# Patient Record
Sex: FEMALE | Race: WHITE | Employment: FULL TIME
[De-identification: names, ages, dates, MRNs, and addresses within clinical notes are randomized per-mention and may not be internally consistent; named-entity substitution may affect disease eponyms.]

---

## 2017-07-15 ENCOUNTER — HEALTH MAINTENANCE LETTER (OUTPATIENT)
Age: 47
End: 2017-07-15

## 2018-07-24 ENCOUNTER — OFFICE VISIT (OUTPATIENT)
Dept: FAMILY MEDICINE | Facility: CLINIC | Age: 48
End: 2018-07-24
Payer: COMMERCIAL

## 2018-07-24 VITALS
HEIGHT: 66 IN | BODY MASS INDEX: 18.45 KG/M2 | OXYGEN SATURATION: 99 % | HEART RATE: 67 BPM | DIASTOLIC BLOOD PRESSURE: 71 MMHG | SYSTOLIC BLOOD PRESSURE: 105 MMHG | TEMPERATURE: 97.9 F | RESPIRATION RATE: 16 BRPM | WEIGHT: 114.8 LBS

## 2018-07-24 DIAGNOSIS — Z00.00 ENCOUNTER FOR ROUTINE ADULT HEALTH EXAMINATION WITHOUT ABNORMAL FINDINGS: Primary | ICD-10-CM

## 2018-07-24 PROCEDURE — G0145 SCR C/V CYTO,THINLAYER,RESCR: HCPCS | Performed by: FAMILY MEDICINE

## 2018-07-24 PROCEDURE — 87624 HPV HI-RISK TYP POOLED RSLT: CPT | Performed by: FAMILY MEDICINE

## 2018-07-24 PROCEDURE — 99396 PREV VISIT EST AGE 40-64: CPT | Performed by: FAMILY MEDICINE

## 2018-07-24 NOTE — PROGRESS NOTES
SUBJECTIVE:   CC: Yessica Gar is an 47 year old woman who presents for preventive health visit.     Physical   Annual:     Getting at least 3 servings of Calcium per day:  Yes    Bi-annual eye exam:  Yes    Dental care twice a year:  Yes    Sleep apnea or symptoms of sleep apnea:  None    Diet:  Vegetarian/vegan    Frequency of exercise:  4-5 days/week    Duration of exercise:  30-45 minutes    Taking medications regularly:  Not Applicable    Additional concerns today:  YES            Today's PHQ-2 Score:   PHQ-2 (  [employer]) 2018   Q1: Little interest or pleasure in doing things 0   Q2: Feeling down, depressed or hopeless 0   PHQ-2 Score 0   Q1: Little interest or pleasure in doing things Not at all   Q2: Feeling down, depressed or hopeless Not at all   PHQ-2 Score 0       Abuse: Current or Past(Physical, Sexual or Emotional)- No  Do you feel safe in your environment - Yes    Social History   Substance Use Topics     Smoking status: Never Smoker     Smokeless tobacco: Never Used     Alcohol use Yes      Comment: a couple times a month     Alcohol Use 2018   If you drink alcohol do you typically have greater than 3 drinks per day OR greater than 7 drinks per week? No       Reviewed orders with patient.  Reviewed health maintenance and updated orders accordingly - Yes  Labs reviewed in EPIC  BP Readings from Last 3 Encounters:   18 105/71   16 116/75   01/06/15 102/62    Wt Readings from Last 3 Encounters:   18 114 lb 12.8 oz (52.1 kg)   16 117 lb (53.1 kg)   01/06/15 119 lb 6.4 oz (54.2 kg)                  Patient Active Problem List   Diagnosis     ASCUS on Pap smear     CARDIOVASCULAR SCREENING; LDL GOAL LESS THAN 160     SAB (spontaneous )     Past Surgical History:   Procedure Laterality Date     NO HISTORY OF SURGERY         Social History   Substance Use Topics     Smoking status: Never Smoker     Smokeless tobacco: Never Used     Alcohol use Yes       Comment: a couple times a month     Family History   Problem Relation Age of Onset     C.A.D. Paternal Grandfather      Hypertension Father      Lipids Father      GASTROINTESTINAL DISEASE Father      Gall bladder removed (not due to stones)/polyps removed from colon age 60     Diabetes Paternal Grandmother      Arthritis Maternal Grandmother      Hypertension Brother      Lipids Brother      Coronary Artery Disease No family hx of      Hyperlipidemia No family hx of      Cerebrovascular Disease No family hx of      Breast Cancer No family hx of      Colon Cancer No family hx of      Prostate Cancer No family hx of      Other Cancer No family hx of      Depression No family hx of      Anxiety Disorder No family hx of      Mental Illness No family hx of      Substance Abuse No family hx of      Anesthesia Reaction No family hx of      Asthma No family hx of      Osteoperosis No family hx of      Genetic Disorder No family hx of      Thyroid Disease No family hx of      Obesity No family hx of      Unknown/Adopted No family hx of          No current outpatient prescriptions on file.     Allergies   Allergen Reactions     Cats      Recent Labs   Lab Test  01/06/15   0740  04/29/14   0743   LDL  74  86   HDL  59  65   TRIG  87  78   TSH  2.81  2.21        Patient under age 50, mutual decision reflected in health maintenance.      Pertinent mammograms are reviewed under the imaging tab.  History of abnormal Pap smear: NO - age 30- 65 PAP every 3 years recommended  PAP / HPV 4/29/2014 4/17/2012 8/3/2010   PAP NIL NIL NIL     Reviewed and updated as needed this visit by clinical staff  Tobacco  Meds  Med Hx  Surg Hx  Fam Hx  Soc Hx        Reviewed and updated as needed this visit by Provider        Past Medical History:   Diagnosis Date     ASCUS favor benign 05, 07, 08    all neg HPV      Past Surgical History:   Procedure Laterality Date     NO HISTORY OF SURGERY         Review of Systems  CONSTITUTIONAL: NEGATIVE  for fever, chills, change in weight  INTEGUMENTARU/SKIN: NEGATIVE for worrisome rashes, moles or lesions  EYES: NEGATIVE for vision changes or irritation  ENT: NEGATIVE for ear, mouth and throat problems  RESP: NEGATIVE for significant cough or SOB  BREAST: NEGATIVE for masses, tenderness or discharge  CV: NEGATIVE for chest pain, palpitations or peripheral edema  GI: NEGATIVE for nausea, abdominal pain, heartburn, or change in bowel habits  : NEGATIVE for unusual urinary or vaginal symptoms. Periods are regular.  MUSCULOSKELETAL: NEGATIVE for significant arthralgias or myalgia  NEURO: NEGATIVE for weakness, dizziness or paresthesias  PSYCHIATRIC: NEGATIVE for changes in mood or affect     OBJECTIVE:   There were no vitals taken for this visit.  Physical Exam  GENERAL: healthy, alert and no distress  EYES: Eyes grossly normal to inspection, PERRL and conjunctivae and sclerae normal  HENT: ear canals and TM's normal, nose and mouth without ulcers or lesions  NECK: no adenopathy, no asymmetry, masses, or scars and thyroid normal to palpation  RESP: lungs clear to auscultation - no rales, rhonchi or wheezes  BREAST: normal without masses, tenderness or nipple discharge and no palpable axillary masses or adenopathy  CV: regular rate and rhythm, normal S1 S2, no S3 or S4, no murmur, click or rub, no peripheral edema and peripheral pulses strong  ABDOMEN: soft, nontender, no hepatosplenomegaly, no masses and bowel sounds normal   (female): normal female external genitalia, normal urethral meatus, vaginal mucosa pink, moist, well rugated, and normal cervix/adnexa/uterus without masses or discharge  MS: no gross musculoskeletal defects noted, no edema  SKIN: no suspicious lesions or rashes  NEURO: Normal strength and tone, mentation intact and speech normal  PSYCH: mentation appears normal, affect normal/bright    Diagnostic Test Results:  none     ASSESSMENT/PLAN:   1. Encounter for routine adult health examination  "without abnormal findings  Discussed diet,calcium,exercise.Went over self breast exam.Thin prep was done.Eyes and teeth UTD.No immunizations needed today.See orders below for tests ordered and screening needed.    - GLUCOSE; Future  - Pap imaged thin layer screen with HPV - recommended age 30 - 65 years (select HPV order below)  - Lipid Profile (Chol, Trig, HDL, LDL calc); Future    COUNSELING:  Reviewed preventive health counseling, as reflected in patient instructions       Regular exercise       Healthy diet/nutrition       Vision screening       Hearing screening       Osteoporosis Prevention/Bone Health    BP Readings from Last 1 Encounters:   04/20/16 116/75     Estimated body mass index is 18.88 kg/(m^2) as calculated from the following:    Height as of 4/20/16: 5' 6\" (1.676 m).    Weight as of 4/20/16: 117 lb (53.1 kg).           reports that she has never smoked. She has never used smokeless tobacco.      Counseling Resources:  ATP IV Guidelines  Pooled Cohorts Equation Calculator  Breast Cancer Risk Calculator  FRAX Risk Assessment  ICSI Preventive Guidelines  Dietary Guidelines for Americans, 2010  USDA's MyPlate  ASA Prophylaxis  Lung CA Screening    Brenda Duckworth MD  [facility]  Answers for HPI/ROS submitted by the patient on 7/24/2018   PHQ-2 Score: 0    "

## 2018-07-24 NOTE — LETTER
August 1, 2018    Yessica Gar  [address] [address]    Dear Yessica,  We are happy to inform you that your PAP smear result from 07/24/18 is normal.  We are now able to do a follow up test on PAP smears. The DNA test is for HPV (Human Papilloma Virus). Cervical cancer is closely linked with certain types of HPV. Your results showed no evidence of high risk HPV.  Therefore we recommend you return in 3 years for your next pap smear.  You will still need to return to the clinic every year for an annual exam and other preventive tests.  Please contact the clinic at [phone] with any questions.  Sincerely,    Brenda Duckworth MD/freddie

## 2018-07-24 NOTE — MR AVS SNAPSHOT
After Visit Summary   7/24/2018    Yessica Gar    MRN: [id number]           Patient Information     Date Of Birth          1970        Visit Information        Provider Department      7/24/2018 1:45 PM Brenda Duckworth MD [facility]        Today's Diagnoses     Encounter for routine adult health examination without abnormal findings    -  1      Care Instructions      Preventive Health Recommendations  Female Ages 40 to 49    Yearly exam:     See your health care provider every year in order to  1. Review health changes.   2. Discuss preventive care.    3. Review your medicines if your doctor prescribed any.      Get a Pap test every three years (unless you have an abnormal result and your provider advises testing more often).      If you get Pap tests with HPV test, you only need to test every 5 years, unless you have an abnormal result. You do not need a Pap test if your uterus was removed (hysterectomy) and you have not had cancer.      You should be tested each year for STDs (sexually transmitted diseases), if you're at risk.     Ask your doctor if you should have a mammogram.      Have a colonoscopy (test for colon cancer) if someone in your family has had colon cancer or polyps before age 50.       Have a cholesterol test every 5 years.       Have a diabetes test (fasting glucose) after age 45. If you are at risk for diabetes, you should have this test every 3 years.    Shots: Get a flu shot each year. Get a tetanus shot every 10 years.     Nutrition:     Eat at least 5 servings of fruits and vegetables each day.    Eat whole-grain bread, whole-wheat pasta and brown rice instead of white grains and rice.    Get adequate Calcium and Vitamin D.      Lifestyle    Exercise at least 150 minutes a week (an average of 30 minutes a day, 5 days a week). This will help you control your weight and prevent disease.    Limit alcohol to one drink per day.    No smoking.     Wear  "sunscreen to prevent skin cancer.    See your dentist every six months for an exam and cleaning.          Follow-ups after your visit        Future tests that were ordered for you today     Open Future Orders        Priority Expected Expires Ordered    GLUCOSE Routine  10/24/2018 2018    Lipid Profile (Chol, Trig, HDL, LDL calc) Routine  10/24/2018 2018            Who to contact     If you have questions or need follow up information about today's clinic visit or your schedule please contact [facility] directly at [phone].  Normal or non-critical lab and imaging results will be communicated to you by [employer], letter or phone within 4 business days after the clinic has received the results. If you do not hear from us within 7 days, please contact the clinic through [employer] or phone. If you have a critical or abnormal lab result, we will notify you by phone as soon as possible.  Submit refill requests through [employer] or call your pharmacy and they will forward the refill request to us. Please allow 3 business days for your refill to be completed.          Additional Information About Your Visit        MyChart Information     [employer] lets you send messages to your doctor, view your test results, renew your prescriptions, schedule appointments and more. To sign up, go to [email or web address] . Click on \"Log in\" on the left side of the screen, which will take you to the Welcome page. Then click on \"Sign up Now\" on the right side of the page.     You will be asked to enter the access code listed below, as well as some personal information. Please follow the directions to create your username and password.     Your access code is: [email or web address]  Expires: 10/22/2018  1:41 PM     Your access code will  in 90 days. If you need help or a new code, please call your [facility] or [phone].        Care EveryWhere ID     This is your Care EveryWhere ID. This could be used by other " "organizations to access your [facility] medical records  [id number]        Your Vitals Were     Pulse Temperature Respirations Height Last Period Pulse Oximetry    67 97.9  F (36.6  C) (Oral) 16 5' 6\" (1.676 m) 07/10/2018 (Exact Date) 99%    Breastfeeding? BMI (Body Mass Index)                No 18.53 kg/m2           Blood Pressure from Last 3 Encounters:   07/24/18 105/71   04/20/16 116/75   01/06/15 102/62    Weight from Last 3 Encounters:   07/24/18 114 lb 12.8 oz (52.1 kg)   04/20/16 117 lb (53.1 kg)   01/06/15 119 lb 6.4 oz (54.2 kg)              We Performed the Following     Pap imaged thin layer screen with HPV - recommended age 30 - 65 years (select HPV order below)        Primary Care Provider Office Phone # Fax #    Brenda Duckworth [phone] [address] [address]        Equal Access to Services     [facility] CenterNETTA : Hadii aad ku hadasho Soomaali, waaxda luqadaha, qaybta kaalmada adeginayamiguel, hortensia gonzáles . So [facility] [phone].    ATENCIÓN: Si habla español, tiene a augustin disposición servicios gratuitos de asistencia lingüística. Llame al [phone].    We comply with applicable federal civil rights laws and [place] laws. We do not discriminate on the basis of race, color, national origin, age, disability, sex, sexual orientation, or gender identity.            Thank you!     Thank you for choosing [facility]  for your care. Our goal is always to provide you with excellent care. Hearing back from our patients is one way we can continue to improve our services. Please take a few minutes to complete the written survey that you may receive in the mail after your visit with us. Thank you!             Your Updated Medication List - Protect others around you: Learn how to safely use, store and throw away your medicines at www.disposemymeds.org.      Notice  As of 7/24/2018  2:27 PM    You have not been prescribed any " medications.

## 2018-07-30 LAB
COPATH REPORT: NORMAL
PAP: NORMAL

## 2018-07-31 LAB
FINAL DIAGNOSIS: NORMAL
HPV HR 12 DNA CVX QL NAA+PROBE: NEGATIVE
HPV16 DNA SPEC QL NAA+PROBE: NEGATIVE
HPV18 DNA SPEC QL NAA+PROBE: NEGATIVE
SPECIMEN DESCRIPTION: NORMAL
SPECIMEN SOURCE CVX/VAG CYTO: NORMAL

## 2018-08-16 ENCOUNTER — OFFICE VISIT (OUTPATIENT)
Dept: PODIATRY | Facility: CLINIC | Age: 48
End: 2018-08-16
Payer: COMMERCIAL

## 2018-08-16 VITALS
DIASTOLIC BLOOD PRESSURE: 58 MMHG | SYSTOLIC BLOOD PRESSURE: 100 MMHG | WEIGHT: 114 LBS | HEIGHT: 66 IN | BODY MASS INDEX: 18.32 KG/M2

## 2018-08-16 DIAGNOSIS — M79.671 RIGHT FOOT PAIN: Primary | ICD-10-CM

## 2018-08-16 PROCEDURE — 99203 OFFICE O/P NEW LOW 30 MIN: CPT | Performed by: PODIATRIST

## 2018-08-16 NOTE — LETTER
"    8/16/2018         RE: Yessica Gar  [address] [address]        Dear Colleague,    Thank you for referring your patient, Yessica Gar, to the [facility]. Please see a copy of my visit note below.    Foot & Ankle Surgery  August 16, 2018    CC: \"pain in right foot\"    I was asked to see Yessiac Gar regarding the chief complaint by:  self    HPI:  Pt is a 47 year old female who presents with above complaint.  R foot pain x 6 weeks, no injury/inciting event, although she is training for a 10 mile run.  Pain is worse in the morning.  It used to crack per patient. Feels like \"the skin is too tight\" over the bump.  Pain worse without shoes.  Describes \"tender to touch, deep ache/shooting pain\".  7/10 daily, worse with \"pointing toes, bending foot\".  She has done \"nothing\" for treatment.      ROS:   Pos for CC.  The patient denies current nausea, vomiting, chills, fevers, belly pain, calf pain, chest pain or SOB.  Complete remainder of ROS is otherwise neg.    VITALS:    Vitals:    08/16/18 1332   BP: 100/58   Weight: 114 lb (51.7 kg)   Height: 5' 6\" (1.676 m)       PMH:    Past Medical History:   Diagnosis Date     ASCUS favor benign 05, 07, 08    all neg HPV       SXHX:    Past Surgical History:   Procedure Laterality Date     NO HISTORY OF SURGERY          MEDS:    No current outpatient prescriptions on file.     No current facility-administered medications for this visit.        ALL:     Allergies   Allergen Reactions     Cats        FMH:    Family History   Problem Relation Age of Onset     C.A.D. Paternal Grandfather      Hypertension Father      Lipids Father      GASTROINTESTINAL DISEASE Father      Gall bladder removed (not due to stones)/polyps removed from colon age 60     Diabetes Paternal Grandmother      Arthritis Maternal Grandmother      Hypertension Brother      Lipids Brother      Coronary Artery Disease No family hx of      " Hyperlipidemia No family hx of      Cerebrovascular Disease No family hx of      Breast Cancer No family hx of      Colon Cancer No family hx of      Prostate Cancer No family hx of      Other Cancer No family hx of      Depression No family hx of      Anxiety Disorder No family hx of      Mental Illness No family hx of      Substance Abuse No family hx of      Anesthesia Reaction No family hx of      Asthma No family hx of      Osteoperosis No family hx of      Genetic Disorder No family hx of      Thyroid Disease No family hx of      Obesity No family hx of      Unknown/Adopted No family hx of        SocHx:    Social History     Social History     Marital status:      Spouse name: Luis F     Number of children: 1     Years of education: N/A     Occupational History      [employer]     Social History Main Topics     Smoking status: Never Smoker     Smokeless tobacco: Never Used     Alcohol use Yes      Comment: a couple times a month     Drug use: No     Sexual activity: Yes     Partners: Male     Birth control/ protection: Condom     Other Topics Concern     Parent/Sibling W/ Cabg, Mi Or Angioplasty Before 65f 55m? No     Social History Narrative    Social Documentation: 12/22/2009        Balanced Diet: YES    Calcium intake: 2-3 per day    Caffeine: 0-1 per day    Exercise:  type of activity no;  0 times per week    Sunscreen: Yes    Seatbelts:  Yes    Self Breast Exam:  No    Self Testicular Exam: n/a    Physical/Emotional/Sexual Abuse: No    Do you feel safe in your environment? Yes        Cholesterol screen up to date: No    Eye Exam up to date: Yes    Dental Exam up to date: Yes    Pap smear up to date: PAP   ASC-US   11/3/08    Mammogram up to date: No: needs referral    Dexa Scan up to date: Does Not Apply    Colonoscopy up to date: Does Not Apply    Immunizations up to date: Unknown    Glucose screen if over 40:  N/a        Misty Garrido [facility]                                                    EXAMINATION:  Gen:   No apparent distress  Neuro:   A&Ox3, no deficits  Psych:    Answering questions appropriately for age and situation with normal affect  Head:    NCAT  Eye:    Visual scanning without deficit  Ear:    Response to auditory stimuli wnl  Lung:    Non-labored breathing on RA noted  Abd:    NTND per patient report  Lymph:    Neg for pitting/non-pitting edema BLE  Vasc:    Pulses palpable, CFT minimally delayed  Neuro:    Light touch sensation intact to all sensory nerve distributions without paresthesias  Derm:    Neg for nodules, lesions or ulcerations  MSK:    Palpable tender firm mass dorsolateral R midfoot, between 4th and 5th met bases, although she has the same palpable mass, albeit not tender, in the same location on the left foot. No other metatarsal, intermetatarsal or adjacent P.brevis tendon pain.    Calf:    Neg for redness, swelling or tenderness    Assessment:  47 year old female with painful right foot mass/bump      Plan:  Discussed etiologies, anatomy and options  1.  Painful R foot mass/bump  -comfortable shoe gear; minimize shoeless ambulation  -RICE/NSAID vs tylenol as aggressively as symptoms dictate  -discussed importance of activity modifications based on pain levels  -discussed walking boot, PO steroid; declined, consider if no improvement is noted  -consider films next visit if no improvement    Follow up:  3 weeks or sooner with acute issues      Patient's medical history was reviewed today    Body mass index is 18.4 kg/(m^2).          Zafar Pacheco DPM FACFAS FACFAOM  Podiatric Foot & Ankle Surgeon  [facility]  [phone]        Again, thank you for allowing me to participate in the care of your patient.        Sincerely,        Zafar Pacheco DPM, JYOTHI

## 2018-08-16 NOTE — PROGRESS NOTES
"Foot & Ankle Surgery  August 16, 2018    CC: \"pain in right foot\"    I was asked to see Yessica Gar regarding the chief complaint by:  self    HPI:  Pt is a 47 year old female who presents with above complaint.  R foot pain x 6 weeks, no injury/inciting event, although she is training for a 10 mile run.  Pain is worse in the morning.  It used to crack per patient. Feels like \"the skin is too tight\" over the bump.  Pain worse without shoes.  Describes \"tender to touch, deep ache/shooting pain\".  7/10 daily, worse with \"pointing toes, bending foot\".  She has done \"nothing\" for treatment.      ROS:   Pos for CC.  The patient denies current nausea, vomiting, chills, fevers, belly pain, calf pain, chest pain or SOB.  Complete remainder of ROS is otherwise neg.    VITALS:    Vitals:    08/16/18 1332   BP: 100/58   Weight: 114 lb (51.7 kg)   Height: 5' 6\" (1.676 m)       PMH:    Past Medical History:   Diagnosis Date     ASCUS favor benign 05, 07, 08    all neg HPV       SXHX:    Past Surgical History:   Procedure Laterality Date     NO HISTORY OF SURGERY          MEDS:    No current outpatient prescriptions on file.     No current facility-administered medications for this visit.        ALL:     Allergies   Allergen Reactions     Cats        FMH:    Family History   Problem Relation Age of Onset     C.A.D. Paternal Grandfather      Hypertension Father      Lipids Father      GASTROINTESTINAL DISEASE Father      Gall bladder removed (not due to stones)/polyps removed from colon age 60     Diabetes Paternal Grandmother      Arthritis Maternal Grandmother      Hypertension Brother      Lipids Brother      Coronary Artery Disease No family hx of      Hyperlipidemia No family hx of      Cerebrovascular Disease No family hx of      Breast Cancer No family hx of      Colon Cancer No family hx of      Prostate Cancer No family hx of      Other Cancer No family hx of      Depression No family hx of      Anxiety " Disorder No family hx of      Mental Illness No family hx of      Substance Abuse No family hx of      Anesthesia Reaction No family hx of      Asthma No family hx of      Osteoperosis No family hx of      Genetic Disorder No family hx of      Thyroid Disease No family hx of      Obesity No family hx of      Unknown/Adopted No family hx of        SocHx:    Social History     Social History     Marital status:      Spouse name: Luis F     Number of children: 1     Years of education: N/A     Occupational History      [employer]     Social History Main Topics     Smoking status: Never Smoker     Smokeless tobacco: Never Used     Alcohol use Yes      Comment: a couple times a month     Drug use: No     Sexual activity: Yes     Partners: Male     Birth control/ protection: Condom     Other Topics Concern     Parent/Sibling W/ Cabg, Mi Or Angioplasty Before 65f 55m? No     Social History Narrative    Social Documentation: 12/22/2009        Balanced Diet: YES    Calcium intake: 2-3 per day    Caffeine: 0-1 per day    Exercise:  type of activity no;  0 times per week    Sunscreen: Yes    Seatbelts:  Yes    Self Breast Exam:  No    Self Testicular Exam: n/a    Physical/Emotional/Sexual Abuse: No    Do you feel safe in your environment? Yes        Cholesterol screen up to date: No    Eye Exam up to date: Yes    Dental Exam up to date: Yes    Pap smear up to date: PAP   ASC-US   11/3/08    Mammogram up to date: No: needs referral    Dexa Scan up to date: Does Not Apply    Colonoscopy up to date: Does Not Apply    Immunizations up to date: Unknown    Glucose screen if over 40:  N/a        Misty Garrido CMA                                                   EXAMINATION:  Gen:   No apparent distress  Neuro:   A&Ox3, no deficits  Psych:    Answering questions appropriately for age and situation with normal affect  Head:    NCAT  Eye:    Visual scanning without deficit  Ear:    Response to auditory stimuli  wnl  Lung:    Non-labored breathing on RA noted  Abd:    NTND per patient report  Lymph:    Neg for pitting/non-pitting edema BLE  Vasc:    Pulses palpable, CFT minimally delayed  Neuro:    Light touch sensation intact to all sensory nerve distributions without paresthesias  Derm:    Neg for nodules, lesions or ulcerations  MSK:    Palpable tender firm mass dorsolateral R midfoot, between 4th and 5th met bases, although she has the same palpable mass, albeit not tender, in the same location on the left foot. No other metatarsal, intermetatarsal or adjacent P.brevis tendon pain.    Calf:    Neg for redness, swelling or tenderness    Assessment:  47 year old female with painful right foot mass/bump      Plan:  Discussed etiologies, anatomy and options  1.  Painful R foot mass/bump  -comfortable shoe gear; minimize shoeless ambulation  -RICE/NSAID vs tylenol as aggressively as symptoms dictate  -discussed importance of activity modifications based on pain levels  -discussed walking boot, PO steroid; declined, consider if no improvement is noted  -consider films next visit if no improvement    Follow up:  3 weeks or sooner with acute issues      Patient's medical history was reviewed today    Body mass index is 18.4 kg/(m^2).          Zafar Pacheco DPM FACFAS FACFAOM  Podiatric Foot & Ankle Surgeon  [facility]  [phone]

## 2018-08-16 NOTE — MR AVS SNAPSHOT
After Visit Summary   8/16/2018    Yessica Gar    MRN: [id number]           Patient Information     Date Of Birth          1970        Visit Information        Provider Department      8/16/2018 1:30 PM Zafar Erwin DPM [facility]        Care Instructions    Thank you for choosing [facility] / [facility] & [facility]le Surgery!    DR. ERWIN'S CLINIC LOCATIONS:   MONDAY - [place] TUESDAY - [place]   [address] [facility]  [address]   [place] [address] [place] [address]   [phone]       THURSDAY AM - [place] THURSDAY PM - [facility]   [address]   [place] [address] [place], [place] [address] [phone]       FRIDAY AM - [place] SET UP SURGERY: [phone] [address] APPOINTMENTS: [phone]   [place] [address] BILLING QUESTIONS: [phone] FAX NUMBER: [phone]     Follow Up: 3 weeks    PRICE THERAPY  Many aches and pains throughout the foot and ankle can be helped with many simple treatments. This is usually described as PRICE Therapy.      P - Protection - often times, inflammation/pain in the lower extremity is not able to improve simply because the areas involved are never allowed to rest. Every step we take can bother the problematic area. Protecting those areas is an important step in the healing process. This may involve a walking cast boot, a special insert/orthotic device, an ankle brace, or simply avoiding barefoot walking.    R - Rest - in addition to protecting the foot/ankle, resting is an important, but often times difficult, treatment option. Getting off your feet when they bother you, and specifically avoiding activities that cause pain/discomfort, are very beneficial to prevent, and treat, foot/ankle pain.      I - Ice - icing regularly can help to decrease inflammation and swelling in the foot, thus decreasing pain. Using an ice  pack or a bag of frozen veggies works very well. Ice for 20 minutes multiple times per day as needed.  Do not place the ice directly on the skin as this can cause tissue damage.    C - Compression - using a compression wrap or an ACE wrap can help to decrease swelling, which can help to decrease pain. Wearing the wraps is generally not needed at night, but they should be worn on a regular basis when you are going to be on your feet for prolonged periods as gravity tends to pull fluids down to your feet/ankles.    E - Elevation - elevating your lower extremities multiple times daily for 15-20 minutes can help to decrease swelling, which works well in decreasing pain levels.    NSAID/Tylenol - Anti-inflammatories like Aleve or ibuprofen, and/or a pain medication, such as Tylenol, can help to improve pain levels and get the issue resolved sooner rather than later. Anyone with liver issues should be careful with Tylenol, and anyone with high blood pressure or heart, stomach or kidney issues should be careful with anti-inflammatories. Please ask if you have questions about these medications, including dosage.      Body Mass Index (BMI)  Many things can cause foot and ankle problems. Foot structure, activity level, foot mechanics and injuries are common causes of pain. One very important issue that often goes unmentioned, is body weight. Extra weight can cause increased stress on muscles, ligaments, bones and tendons. Sometimes just a few extra pounds is all it takes to put one over her/his threshold. Without reducing that stress, it can be difficult to alleviate pain. Some people are uncomfortable addressing this issue, but we feel it is important for you to think about it. As Foot &  Ankle specialists, our job is addressing the lower extremity problem and possible causes. Regarding extra body weight, we encourage patients to discuss diet and weight management plans with their primary care doctors. It is this team  "approach that gives you the best opportunity for pain relief and getting you back on your feet.            Follow-ups after your visit        Who to contact     If you have questions or need follow up information about today's clinic visit or your schedule please contact [facility] directly at [phone].  Normal or non-critical lab and imaging results will be communicated to you by MyChart, letter or phone within 4 business days after the clinic has received the results. If you do not hear from us within 7 days, please contact the clinic through [employer] or phone. If you have a critical or abnormal lab result, we will notify you by phone as soon as possible.  Submit refill requests through [employer] or call your pharmacy and they will forward the refill request to us. Please allow 3 business days for your refill to be completed.          Additional Information About Your Visit        MyChart Information     [employer] gives you secure access to your electronic health record. If you see a primary care provider, you can also send messages to your care team and make appointments. If you have questions, please call your primary care clinic.  If you do not have a primary care provider, please call [phone] and they will assist you.        Care EveryWhere ID     This is your Care EveryWhere ID. This could be used by other organizations to access your [place] medical records  [id number]        Your Vitals Were     Height BMI (Body Mass Index)                5' 6\" (1.676 m) 18.4 kg/m2           Blood Pressure from Last 3 Encounters:   08/16/18 100/58   07/24/18 105/71   04/20/16 116/75    Weight from Last 3 Encounters:   08/16/18 114 lb (51.7 kg)   07/24/18 114 lb 12.8 oz (52.1 kg)   04/20/16 117 lb (53.1 kg)              Today, you had the following     No orders found for display       Primary Care Provider Office Phone # Fax #    Brenda Duckworth [phone] [address] "   [facility] [address]        Equal Access to Services     [facility] DAMIAN : Hadii aad ku hadcarolinekaterina Saravia, mariaelena richardson, hortensia hidalgo. So [facility] [phone].    ATENCIÓN: Si habla español, tiene a augustin disposición servicios gratuitos de asistencia lingüística. Llame al [phone].    We comply with applicable federal civil rights laws and [place] laws. We do not discriminate on the basis of race, color, national origin, age, disability, sex, sexual orientation, or gender identity.            Thank you!     Thank you for choosing [facility]  for your care. Our goal is always to provide you with excellent care. Hearing back from our patients is one way we can continue to improve our services. Please take a few minutes to complete the written survey that you may receive in the mail after your visit with us. Thank you!             Your Updated Medication List - Protect others around you: Learn how to safely use, store and throw away your medicines at www.disposemymeds.org.      Notice  As of 8/16/2018  1:59 PM    You have not been prescribed any medications.

## 2018-08-16 NOTE — PATIENT INSTRUCTIONS
Thank you for choosing [facility] / Foot & Ankle Surgery!    DR. ERWIN'S CLINIC LOCATIONS:   MONDAY - [place] TUESDAY - [place]   [address] [facility]  [address]   [place] [address] [place] [address]   [phone]       THURSDAY AM - [place] THURSDAY PM - [place]   [address]   [place] [address] [place], [place] [address] [phone]       FRIDAY AM - [place] SET UP SURGERY: [phone] [address] APPOINTMENTS: [phone]   [place] [address] BILLING QUESTIONS: [phone] FAX NUMBER: [phone]     Follow Up: 3 weeks    PRICE THERAPY  Many aches and pains throughout the foot and ankle can be helped with many simple treatments. This is usually described as PRICE Therapy.      P - Protection - often times, inflammation/pain in the lower extremity is not able to improve simply because the areas involved are never allowed to rest. Every step we take can bother the problematic area. Protecting those areas is an important step in the healing process. This may involve a walking cast boot, a special insert/orthotic device, an ankle brace, or simply avoiding barefoot walking.    R - Rest - in addition to protecting the foot/ankle, resting is an important, but often times difficult, treatment option. Getting off your feet when they bother you, and specifically avoiding activities that cause pain/discomfort, are very beneficial to prevent, and treat, foot/ankle pain.      I - Ice - icing regularly can help to decrease inflammation and swelling in the foot, thus decreasing pain. Using an ice pack or a bag of frozen veggies works very well. Ice for 20 minutes multiple times per day as needed.  Do not place the ice directly on the skin as this can cause tissue damage.    C - Compression - using a compression wrap or an ACE wrap can help to decrease swelling, which can help to decrease pain. Wearing the  wraps is generally not needed at night, but they should be worn on a regular basis when you are going to be on your feet for prolonged periods as gravity tends to pull fluids down to your feet/ankles.    E - Elevation - elevating your lower extremities multiple times daily for 15-20 minutes can help to decrease swelling, which works well in decreasing pain levels.    NSAID/Tylenol - Anti-inflammatories like Aleve or ibuprofen, and/or a pain medication, such as Tylenol, can help to improve pain levels and get the issue resolved sooner rather than later. Anyone with liver issues should be careful with Tylenol, and anyone with high blood pressure or heart, stomach or kidney issues should be careful with anti-inflammatories. Please ask if you have questions about these medications, including dosage.      Body Mass Index (BMI)  Many things can cause foot and ankle problems. Foot structure, activity level, foot mechanics and injuries are common causes of pain. One very important issue that often goes unmentioned, is body weight. Extra weight can cause increased stress on muscles, ligaments, bones and tendons. Sometimes just a few extra pounds is all it takes to put one over her/his threshold. Without reducing that stress, it can be difficult to alleviate pain. Some people are uncomfortable addressing this issue, but we feel it is important for you to think about it. As Foot &  Ankle specialists, our job is addressing the lower extremity problem and possible causes. Regarding extra body weight, we encourage patients to discuss diet and weight management plans with their primary care doctors. It is this team approach that gives you the best opportunity for pain relief and getting you back on your feet.

## 2020-02-10 ENCOUNTER — HEALTH MAINTENANCE LETTER (OUTPATIENT)
Age: 50
End: 2020-02-10

## 2020-02-26 ENCOUNTER — OFFICE VISIT (OUTPATIENT)
Dept: FAMILY MEDICINE | Facility: CLINIC | Age: 50
End: 2020-02-26
Payer: COMMERCIAL

## 2020-02-26 VITALS
OXYGEN SATURATION: 99 % | HEIGHT: 66 IN | HEART RATE: 72 BPM | BODY MASS INDEX: 18.72 KG/M2 | WEIGHT: 116.5 LBS | TEMPERATURE: 98.2 F | DIASTOLIC BLOOD PRESSURE: 70 MMHG | RESPIRATION RATE: 15 BRPM | SYSTOLIC BLOOD PRESSURE: 111 MMHG

## 2020-02-26 DIAGNOSIS — Z00.00 ENCOUNTER FOR ROUTINE ADULT HEALTH EXAMINATION WITHOUT ABNORMAL FINDINGS: Primary | ICD-10-CM

## 2020-02-26 PROCEDURE — 99396 PREV VISIT EST AGE 40-64: CPT | Performed by: FAMILY MEDICINE

## 2020-02-26 ASSESSMENT — MIFFLIN-ST. JEOR: SCORE: 1170.19

## 2020-02-26 NOTE — PROGRESS NOTES
SUBJECTIVE:   CC: Yessica Gar is an 49 year old woman who presents for preventive health visit.     Healthy Habits:     Getting at least 3 servings of Calcium per day:  Yes    Bi-annual eye exam:  Yes    Dental care twice a year:  Yes    Sleep apnea or symptoms of sleep apnea:  None    Diet:  Vegetarian/vegan    Frequency of exercise:  4-5 days/week    Duration of exercise:  30-45 minutes    Taking medications regularly:  Yes    Medication side effects:  Not applicable    PHQ-2 Total Score: 0    Additional concerns today:  Yes              Today's PHQ-2 Score:   PHQ-2 (  [employer]) 2020   Q1: Little interest or pleasure in doing things 0   Q2: Feeling down, depressed or hopeless 0   PHQ-2 Score 0   Q1: Little interest or pleasure in doing things Not at all   Q2: Feeling down, depressed or hopeless Not at all   PHQ-2 Score 0       Abuse: Current or Past(Physical, Sexual or Emotional)- No  Do you feel safe in your environment? Yes        Social History     Tobacco Use     Smoking status: Never Smoker     Smokeless tobacco: Never Used   Substance Use Topics     Alcohol use: Yes     Comment: a couple times a month     If you drink alcohol do you typically have >3 drinks per day or >7 drinks per week? No    Alcohol Use 2020   Prescreen: >3 drinks/day or >7 drinks/week? No   Prescreen: >3 drinks/day or >7 drinks/week? -   No flowsheet data found.    Reviewed orders with patient.  Reviewed health maintenance and updated orders accordingly - Yes  Lab work is in process  Labs reviewed in EPIC  BP Readings from Last 3 Encounters:   20 111/70   18 100/58   18 105/71    Wt Readings from Last 3 Encounters:   20 52.8 kg (116 lb 8 oz)   18 51.7 kg (114 lb)   18 52.1 kg (114 lb 12.8 oz)                  Patient Active Problem List   Diagnosis     CARDIOVASCULAR SCREENING; LDL GOAL LESS THAN 160     SAB (spontaneous )     Past Surgical History:   Procedure  Laterality Date     NO HISTORY OF SURGERY         Social History     Tobacco Use     Smoking status: Never Smoker     Smokeless tobacco: Never Used   Substance Use Topics     Alcohol use: Yes     Comment: a couple times a month     Family History   Problem Relation Age of Onset     C.A.D. Paternal Grandfather      Hypertension Father      Lipids Father      Gastrointestinal Disease Father         Gall bladder removed (not due to stones)/polyps removed from colon age 60     Diabetes Paternal Grandmother      Arthritis Maternal Grandmother      Hypertension Brother      Lipids Brother      Coronary Artery Disease No family hx of      Hyperlipidemia No family hx of      Cerebrovascular Disease No family hx of      Breast Cancer No family hx of      Colon Cancer No family hx of      Prostate Cancer No family hx of      Other Cancer No family hx of      Depression No family hx of      Anxiety Disorder No family hx of      Mental Illness No family hx of      Substance Abuse No family hx of      Anesthesia Reaction No family hx of      Asthma No family hx of      Osteoporosis No family hx of      Genetic Disorder No family hx of      Thyroid Disease No family hx of      Obesity No family hx of      Unknown/Adopted No family hx of          No current outpatient medications on file.     Allergies   Allergen Reactions     Cats      Recent Labs   Lab Test 01/06/15  0740 04/29/14  0743   LDL 74 86   HDL 59 65   TRIG 87 78   TSH 2.81 2.21        Mammogram Screening: Patient under age 50, mutual decision reflected in health maintenance.      Pertinent mammograms are reviewed under the imaging tab.  History of abnormal Pap smear: NO - age 30- 65 PAP every 3 years recommended  PAP / HPV Latest Ref Rng & Units 7/24/2018 4/29/2014 4/17/2012   PAP - NIL NIL NIL   HPV 16 DNA NEG:Negative Negative - -   HPV 18 DNA NEG:Negative Negative - -   OTHER HR HPV NEG:Negative Negative - -     Reviewed and updated as needed this visit by clinical  "staff  Tobacco  Allergies  Meds  Problems  Med Hx  Surg Hx  Fam Hx         Reviewed and updated as needed this visit by Provider        Past Medical History:   Diagnosis Date     ASCUS favor benign 05, 07, 08    all neg HPV      Past Surgical History:   Procedure Laterality Date     NO HISTORY OF SURGERY         Review of Systems  CONSTITUTIONAL: NEGATIVE for fever, chills, change in weight  INTEGUMENTARU/SKIN: NEGATIVE for worrisome rashes, moles or lesions  EYES: NEGATIVE for vision changes or irritation  ENT: NEGATIVE for ear, mouth and throat problems  RESP: NEGATIVE for significant cough or SOB  BREAST: NEGATIVE for masses, tenderness or discharge  CV: NEGATIVE for chest pain, palpitations or peripheral edema  GI: NEGATIVE for nausea, abdominal pain, heartburn, or change in bowel habits  : NEGATIVE for unusual urinary or vaginal symptoms. Periods are regular.  MUSCULOSKELETAL: NEGATIVE for significant arthralgias or myalgia  NEURO: NEGATIVE for weakness, dizziness or paresthesias  PSYCHIATRIC: NEGATIVE for changes in mood or affect     OBJECTIVE:   /70   Pulse 72   Temp 98.2  F (36.8  C) (Oral)   Resp 15   Ht 1.676 m (5' 6\")   Wt 52.8 kg (116 lb 8 oz)   LMP 02/17/2020 (Exact Date)   SpO2 99%   BMI 18.80 kg/m    Physical Exam  GENERAL: healthy, alert and no distress  EYES: Eyes grossly normal to inspection, PERRL and conjunctivae and sclerae normal  HENT: ear canals and TM's normal, nose and mouth without ulcers or lesions  NECK: no adenopathy, no asymmetry, masses, or scars and thyroid normal to palpation  RESP: lungs clear to auscultation - no rales, rhonchi or wheezes  BREAST: normal without masses, tenderness or nipple discharge and no palpable axillary masses or adenopathy  CV: regular rate and rhythm, normal S1 S2, no S3 or S4, no murmur, click or rub, no peripheral edema and peripheral pulses strong  ABDOMEN: soft, nontender, no hepatosplenomegaly, no masses and bowel sounds " "normal  MS: no gross musculoskeletal defects noted, no edema  SKIN: no suspicious lesions or rashes  NEURO: Normal strength and tone, mentation intact and speech normal  PSYCH: mentation appears normal, affect normal/bright    Diagnostic Test Results:  Labs reviewed in Epic  none     ASSESSMENT/PLAN:   1. Encounter for routine adult health examination without abnormal findings  Discussed diet,calcium,exercise.Went over self breast exam.Thin prep was NOT done.Eyes and teeth UTD.No immunizations needed today.See orders below for tests ordered and screening needed.    - GLUCOSE; Future  - Lipid Profile (Chol, Trig, HDL, LDL calc); Future    COUNSELING:  Reviewed preventive health counseling, as reflected in patient instructions       Regular exercise       Healthy diet/nutrition       Vision screening    Estimated body mass index is 18.8 kg/m  as calculated from the following:    Height as of this encounter: 1.676 m (5' 6\").    Weight as of this encounter: 52.8 kg (116 lb 8 oz).         reports that she has never smoked. She has never used smokeless tobacco.      Counseling Resources:  ATP IV Guidelines  Pooled Cohorts Equation Calculator  Breast Cancer Risk Calculator  FRAX Risk Assessment  ICSI Preventive Guidelines  Dietary Guidelines for Americans, 2010  USDA's MyPlate  ASA Prophylaxis  Lung CA Screening    Brenda Duckworth MD  [facility]  "

## 2020-02-26 NOTE — PATIENT INSTRUCTIONS
Preventive Health Recommendations  Female Ages 40 to 49    Yearly exam:     See your health care provider every year in order to  1. Review health changes.   2. Discuss preventive care.    3. Review your medicines if your doctor prescribed any.      Get a Pap test every three years (unless you have an abnormal result and your provider advises testing more often).      If you get Pap tests with HPV test, you only need to test every 5 years, unless you have an abnormal result. You do not need a Pap test if your uterus was removed (hysterectomy) and you have not had cancer.      You should be tested each year for STDs (sexually transmitted diseases), if you're at risk.     Ask your doctor if you should have a mammogram.      Have a colonoscopy (test for colon cancer) if someone in your family has had colon cancer or polyps before age 50.       Have a cholesterol test every 5 years.       Have a diabetes test (fasting glucose) after age 45. If you are at risk for diabetes, you should have this test every 3 years.    Shots: Get a flu shot each year. Get a tetanus shot every 10 years.     Nutrition:     Eat at least 5 servings of fruits and vegetables each day.    Eat whole-grain bread, whole-wheat pasta and brown rice instead of white grains and rice.    Get adequate Calcium and Vitamin D.      Lifestyle    Exercise at least 150 minutes a week (an average of 30 minutes a day, 5 days a week). This will help you control your weight and prevent disease.    Limit alcohol to one drink per day.    No smoking.     Wear sunscreen to prevent skin cancer.    See your dentist every six months for an exam and cleaning.  PLEASE CALL TO SCHEDULE YOUR MAMMOGRAM  [facility] [phone]  [facility] [place] [phone]  [facility]   [phone]  Central Scheduling (all locations) [phone]    If you are under/uninsured, we recommend you contact the [facility]. They offer mammograms on a  sliding fee charge. You can schedule with them at [phone].

## 2020-02-26 NOTE — NURSING NOTE
"Chief Complaint   Patient presents with     Physical     /70   Pulse 72   Temp 98.2  F (36.8  C) (Oral)   Resp 15   Ht 1.676 m (5' 6\")   Wt 52.8 kg (116 lb 8 oz)   LMP 02/17/2020 (Exact Date)   SpO2 99%   BMI 18.80 kg/m   Estimated body mass index is 18.8 kg/m  as calculated from the following:    Height as of this encounter: 1.676 m (5' 6\").    Weight as of this encounter: 52.8 kg (116 lb 8 oz).  Medication Reconciliation: complete        Health Maintenance Due Pending Provider Review:  NONE    n/a    Seema Duffy MA  [facility]  [phone]  "

## 2020-11-16 ENCOUNTER — HEALTH MAINTENANCE LETTER (OUTPATIENT)
Age: 50
End: 2020-11-16

## 2021-04-03 ENCOUNTER — HEALTH MAINTENANCE LETTER (OUTPATIENT)
Age: 51
End: 2021-04-03

## 2021-04-08 NOTE — PROGRESS NOTES
SUBJECTIVE:   CC: Yessica Gar is an 50 year old woman who presents for preventive health visit.       Patient has been advised of split billing requirements and indicates understanding: Yes  Healthy Habits:     Getting at least 3 servings of Calcium per day:  Yes    Bi-annual eye exam:  Yes    Dental care twice a year:  Yes    Sleep apnea or symptoms of sleep apnea:  None    Diet:  Vegetarian/vegan    Frequency of exercise:  4-5 days/week    Duration of exercise:  30-45 minutes    Taking medications regularly:  Not Applicable    Medication side effects:  Not applicable    PHQ-2 Total Score: 0    Additional concerns today:  Yes      1) Right knee hurts , FH of arthritis , when running she wears a sleeve , knee hurts after she is done running  Difficulty to straighten the knee , no   2) uterine prolapse , feels in the shower         Today's PHQ-2 Score:   PHQ-2 ( 1999 [employer]) 3/2/2021   Q1: Little interest or pleasure in doing things 0   Q2: Feeling down, depressed or hopeless 0   PHQ-2 Score 0   Q1: Little interest or pleasure in doing things Not at all   Q2: Feeling down, depressed or hopeless Not at all   PHQ-2 Score 0       Abuse: Current or Past (Physical, Sexual or Emotional) - No  Do you feel safe in your environment? Yes    Have you ever done Advance Care Planning? (For example, a Health Directive, POLST, or a discussion with a medical provider or your loved ones about your wishes): No, advance care planning information given to patient to review.  Patient declined advance care planning discussion at this time.    Social History     Tobacco Use     Smoking status: Never Smoker     Smokeless tobacco: Never Used   Substance Use Topics     Alcohol use: Yes     Comment: a couple times a month     If you drink alcohol do you typically have >3 drinks per day or >7 drinks per week? No    No flowsheet data found.    Reviewed orders with patient.  Reviewed health maintenance and updated orders  accordingly - Yes  Lab work is in process  Labs reviewed in EPIC  BP Readings from Last 3 Encounters:   21 118/75   20 111/70   18 100/58    Wt Readings from Last 3 Encounters:   21 54.9 kg (121 lb)   20 52.8 kg (116 lb 8 oz)   18 51.7 kg (114 lb)                  Patient Active Problem List   Diagnosis     CARDIOVASCULAR SCREENING; LDL GOAL LESS THAN 160     SAB (spontaneous )     Past Surgical History:   Procedure Laterality Date     NO HISTORY OF SURGERY         Social History     Tobacco Use     Smoking status: Never Smoker     Smokeless tobacco: Never Used   Substance Use Topics     Alcohol use: Yes     Comment: a couple times a month     Family History   Problem Relation Age of Onset     C.A.D. Paternal Grandfather      Hypertension Father      Lipids Father      Gastrointestinal Disease Father         Gall bladder removed (not due to stones)/polyps removed from colon age 60     Hyperlipidemia Father      Cerebrovascular Disease Father      Diabetes Paternal Grandmother      Arthritis Maternal Grandmother      Genetic Disorder Mother         autoimmune     Hypertension Brother      Lipids Brother      Coronary Artery Disease No family hx of      Hyperlipidemia No family hx of      Cerebrovascular Disease No family hx of      Breast Cancer No family hx of      Colon Cancer No family hx of      Prostate Cancer No family hx of      Other Cancer No family hx of      Depression No family hx of      Anxiety Disorder No family hx of      Mental Illness No family hx of      Substance Abuse No family hx of      Anesthesia Reaction No family hx of      Asthma No family hx of      Osteoporosis No family hx of      Genetic Disorder No family hx of      Thyroid Disease No family hx of      Obesity No family hx of      Unknown/Adopted No family hx of          No current outpatient medications on file.     Allergies   Allergen Reactions     Cats      Recent Labs   Lab Test  04/09/21  1434 01/06/15  0740 04/29/14  0743   A1C 5.3  --   --    LDL  --  74 86   HDL  --  59 65   TRIG  --  87 78   TSH  --  2.81 2.21        Breast Cancer Screening:  Any new diagnosis of family breast, ovarian, or bowel cancer? No    FSH-7: No flowsheet data found.    Mammogram Screening: Recommended annual mammography  Pertinent mammograms are reviewed under the imaging tab.    History of abnormal Pap smear: NO - age 30- 65 PAP every 3 years recommended  PAP / HPV Latest Ref Rng & Units 7/24/2018 4/29/2014 4/17/2012   PAP - NIL NIL NIL   HPV 16 DNA NEG:Negative Negative - -   HPV 18 DNA NEG:Negative Negative - -   OTHER HR HPV NEG:Negative Negative - -     Reviewed and updated as needed this visit by clinical staff                 Reviewed and updated as needed this visit by Provider                Past Medical History:   Diagnosis Date     Arthritis 1988     ASCUS favor benign 05, 07, 08    all neg HPV      Past Surgical History:   Procedure Laterality Date     NO HISTORY OF SURGERY         Review of Systems  CONSTITUTIONAL: NEGATIVE for fever, chills, change in weight  INTEGUMENTARY/SKIN: NEGATIVE for worrisome rashes, moles or lesions  EYES: NEGATIVE for vision changes or irritation  ENT: NEGATIVE for ear, mouth and throat problems  RESP: NEGATIVE for significant cough or SOB  BREAST: NEGATIVE for masses, tenderness or discharge  CV: NEGATIVE for chest pain, palpitations or peripheral edema  GI: NEGATIVE for nausea, abdominal pain, heartburn, or change in bowel habits  : NEGATIVE for unusual urinary or vaginal symptoms. No vaginal bleeding.  MUSCULOSKELETAL: NEGATIVE for significant arthralgias or myalgia  NEURO: NEGATIVE for weakness, dizziness or paresthesias  PSYCHIATRIC: NEGATIVE for changes in mood or affect      OBJECTIVE:   There were no vitals taken for this visit.  Physical Exam  GENERAL APPEARANCE: healthy, alert and no distress  EYES: Eyes grossly normal to inspection, PERRL and conjunctivae  and sclerae normal  HENT: ear canals and TM's normal, nose and mouth without ulcers or lesions, oropharynx clear and oral mucous membranes moist  NECK: no adenopathy, no asymmetry, masses, or scars and thyroid normal to palpation  RESP: lungs clear to auscultation - no rales, rhonchi or wheezes  BREAST: normal without masses, tenderness or nipple discharge and no palpable axillary masses or adenopathy  CV: regular rate and rhythm, normal S1 S2, no S3 or S4, no murmur, click or rub, no peripheral edema and peripheral pulses strong  ABDOMEN: soft, nontender, no hepatosplenomegaly, no masses and bowel sounds normal   (female): normal female external genitalia, normal urethral meatus, vaginal mucosal atrophy noted, normal cervix, adnexae, and uterus without masses or abnormal discharge  MS: no musculoskeletal defects are noted and gait is age appropriate without ataxia  SKIN: no suspicious lesions or rashes  NEURO: Normal strength and tone, sensory exam grossly normal, mentation intact and speech normal  PSYCH: mentation appears normal and affect normal/bright    Diagnostic Test Results:  Labs reviewed in Epic  Results for orders placed or performed in visit on 04/09/21 (from the past 24 hour(s))   Hemoglobin A1c   Result Value Ref Range    Hemoglobin A1C 5.3 0 - 5.6 %       ASSESSMENT/PLAN:   1. Routine general medical examination at a health care facility  Discussed diet,calcium,exercise.Went over self breast exam.Thin prep was done.Eyes and teeth UTD.No immunizations needed today.See orders below for tests ordered and screening needed.    - GLUCOSE  - Pap imaged thin layer screen with HPV - recommended age 30 - 65 years (select HPV order below)  - Lipid panel reflex to direct LDL Fasting    2. Colon cancer screening  Will do   - COLOGUARD(EXACT SCIENCES)    3. Family history of diabetes mellitus  Will check as she has a FH of DM type 2   - Hemoglobin A1c    4. Acute pain of right knee  I do not see any  "abnormalities on the X ray , we discussed ortho referral to rule out some meniscal issues or ligaments tear as she has been having difficulty straighten the knee   - XR Knee Right 3 Views; Future  5. Hot flashes , new for her last few months , she is going to try diet changes and OTC therapies and if not better will let me know will discussed prescription meds for this   Patient has been advised of split billing requirements and indicates understanding: Yes  COUNSELING:  Reviewed preventive health counseling, as reflected in patient instructions       Regular exercise       Healthy diet/nutrition       Vision screening       (Fabiana)menopause management    Estimated body mass index is 18.8 kg/m  as calculated from the following:    Height as of 2/26/20: 1.676 m (5' 6\").    Weight as of 2/26/20: 52.8 kg (116 lb 8 oz).        She reports that she has never smoked. She has never used smokeless tobacco.      Counseling Resources:  ATP IV Guidelines  Pooled Cohorts Equation Calculator  Breast Cancer Risk Calculator  BRCA-Related Cancer Risk Assessment: FHS-7 Tool  FRAX Risk Assessment  ICSI Preventive Guidelines  Dietary Guidelines for Americans, 2010  USDA's MyPlate  ASA Prophylaxis  Lung CA Screening    Brenda Duckworth MD  [facility]  "

## 2021-04-08 NOTE — PATIENT INSTRUCTIONS
Preventive Health Recommendations  Female Ages 50 - 64    Yearly exam: See your health care provider every year in order to  o Review health changes.   o Discuss preventive care.    o Review your medicines if your doctor has prescribed any.      Get a Pap test every three years (unless you have an abnormal result and your provider advises testing more often).    If you get Pap tests with HPV test, you only need to test every 5 years, unless you have an abnormal result.     You do not need a Pap test if your uterus was removed (hysterectomy) and you have not had cancer.    You should be tested each year for STDs (sexually transmitted diseases) if you're at risk.     Have a mammogram every 1 to 2 years.    Have a colonoscopy at age 50, or have a yearly FIT test (stool test). These exams screen for colon cancer.      Have a cholesterol test every 5 years, or more often if advised.    Have a diabetes test (fasting glucose) every three years. If you are at risk for diabetes, you should have this test more often.     If you are at risk for osteoporosis (brittle bone disease), think about having a bone density scan (DEXA).    Shots: Get a flu shot each year. Get a tetanus shot every 10 years.    Nutrition:     Eat at least 5 servings of fruits and vegetables each day.    Eat whole-grain bread, whole-wheat pasta and brown rice instead of white grains and rice.    Get adequate Calcium and Vitamin D.     Lifestyle    Exercise at least 150 minutes a week (30 minutes a day, 5 days a week). This will help you control your weight and prevent disease.    Limit alcohol to one drink per day.    No smoking.     Wear sunscreen to prevent skin cancer.     See your dentist every six months for an exam and cleaning.    See your eye doctor every 1 to 2 years.  PLEASE CALL TO SCHEDULE YOUR MAMMOGRAM  [facility] [phone]  [facility] [phone]  [facility]   [phone]  [place]  Scheduling (all locations) [phone]    If you are under/uninsured, we recommend you contact the [facility]. They offer mammograms on a sliding fee charge. You can schedule with them at [phone].

## 2021-04-09 ENCOUNTER — OFFICE VISIT (OUTPATIENT)
Dept: FAMILY MEDICINE | Facility: CLINIC | Age: 51
End: 2021-04-09
Payer: COMMERCIAL

## 2021-04-09 ENCOUNTER — ANCILLARY PROCEDURE (OUTPATIENT)
Dept: GENERAL RADIOLOGY | Facility: CLINIC | Age: 51
End: 2021-04-09
Attending: FAMILY MEDICINE
Payer: COMMERCIAL

## 2021-04-09 VITALS
HEART RATE: 70 BPM | TEMPERATURE: 97.5 F | RESPIRATION RATE: 16 BRPM | HEIGHT: 66 IN | OXYGEN SATURATION: 100 % | SYSTOLIC BLOOD PRESSURE: 118 MMHG | DIASTOLIC BLOOD PRESSURE: 75 MMHG | WEIGHT: 121 LBS | BODY MASS INDEX: 19.44 KG/M2

## 2021-04-09 DIAGNOSIS — M25.561 ACUTE PAIN OF RIGHT KNEE: ICD-10-CM

## 2021-04-09 DIAGNOSIS — Z00.00 ROUTINE GENERAL MEDICAL EXAMINATION AT A HEALTH CARE FACILITY: Primary | ICD-10-CM

## 2021-04-09 DIAGNOSIS — Z12.11 COLON CANCER SCREENING: ICD-10-CM

## 2021-04-09 DIAGNOSIS — Z83.3 FAMILY HISTORY OF DIABETES MELLITUS: ICD-10-CM

## 2021-04-09 LAB
CHOLEST SERPL-MCNC: 229 MG/DL
GLUCOSE SERPL-MCNC: 85 MG/DL (ref 70–99)
HBA1C MFR BLD: 5.3 % (ref 0–5.6)
HDLC SERPL-MCNC: 92 MG/DL
LDLC SERPL CALC-MCNC: 123 MG/DL
NONHDLC SERPL-MCNC: 137 MG/DL
TRIGL SERPL-MCNC: 68 MG/DL

## 2021-04-09 PROCEDURE — 83036 HEMOGLOBIN GLYCOSYLATED A1C: CPT | Performed by: FAMILY MEDICINE

## 2021-04-09 PROCEDURE — 80061 LIPID PANEL: CPT | Performed by: FAMILY MEDICINE

## 2021-04-09 PROCEDURE — G0124 SCREEN C/V THIN LAYER BY MD: HCPCS | Performed by: PATHOLOGY

## 2021-04-09 PROCEDURE — G0145 SCR C/V CYTO,THINLAYER,RESCR: HCPCS | Performed by: FAMILY MEDICINE

## 2021-04-09 PROCEDURE — 87624 HPV HI-RISK TYP POOLED RSLT: CPT | Performed by: FAMILY MEDICINE

## 2021-04-09 PROCEDURE — 99396 PREV VISIT EST AGE 40-64: CPT | Performed by: FAMILY MEDICINE

## 2021-04-09 PROCEDURE — 36415 COLL VENOUS BLD VENIPUNCTURE: CPT | Performed by: FAMILY MEDICINE

## 2021-04-09 PROCEDURE — 99213 OFFICE O/P EST LOW 20 MIN: CPT | Mod: 25 | Performed by: FAMILY MEDICINE

## 2021-04-09 PROCEDURE — 82947 ASSAY GLUCOSE BLOOD QUANT: CPT | Performed by: FAMILY MEDICINE

## 2021-04-09 PROCEDURE — 73562 X-RAY EXAM OF KNEE 3: CPT | Mod: RT | Performed by: RADIOLOGY

## 2021-04-09 ASSESSMENT — MIFFLIN-ST. JEOR: SCORE: 1177.66

## 2021-04-12 ENCOUNTER — DOCUMENTATION ONLY (OUTPATIENT)
Dept: CARE COORDINATION | Facility: CLINIC | Age: 51
End: 2021-04-12

## 2021-04-12 NOTE — TELEPHONE ENCOUNTER
DIAGNOSIS: Acute pain of right knee /Dr Duckworth/ XR   APPOINTMENT DATE: 4.26.21   NOTES STATUS DETAILS   OFFICE NOTE from referring provider Internal 4.9.21 Dr Brenda Duckworth, [facility]   OFFICE NOTE from other specialist N/A    DISCHARGE SUMMARY from hospital N/A    DISCHARGE REPORT from the ER N/A    OPERATIVE REPORT N/A    EMG report N/A    MEDICATION LIST Care Everywhere    MRI N/A    DEXA (osteoporosis/bone health) N/A    CT SCAN N/A    XRAYS (IMAGES & REPORTS) Internal 4.9.21 R knee

## 2021-04-15 LAB
COPATH REPORT: ABNORMAL
PAP: ABNORMAL

## 2021-04-25 ASSESSMENT — ENCOUNTER SYMPTOMS
MUSCLE WEAKNESS: 0
BACK PAIN: 0
STIFFNESS: 0
JOINT SWELLING: 0
MUSCLE CRAMPS: 0
NECK PAIN: 1
MYALGIAS: 1
ARTHRALGIAS: 1

## 2021-04-26 ENCOUNTER — PRE VISIT (OUTPATIENT)
Dept: ORTHOPEDICS | Facility: CLINIC | Age: 51
End: 2021-04-26

## 2021-04-26 ENCOUNTER — OFFICE VISIT (OUTPATIENT)
Dept: ORTHOPEDICS | Facility: CLINIC | Age: 51
End: 2021-04-26
Attending: FAMILY MEDICINE
Payer: COMMERCIAL

## 2021-04-26 VITALS — BODY MASS INDEX: 19.44 KG/M2 | HEIGHT: 66 IN | WEIGHT: 121 LBS

## 2021-04-26 DIAGNOSIS — M25.561 ACUTE PAIN OF RIGHT KNEE: ICD-10-CM

## 2021-04-26 PROCEDURE — 99204 OFFICE O/P NEW MOD 45 MIN: CPT | Mod: GC | Performed by: ORTHOPAEDIC SURGERY

## 2021-04-26 ASSESSMENT — MIFFLIN-ST. JEOR: SCORE: 1177.66

## 2021-04-26 NOTE — PROGRESS NOTES
Patient seen and examined with the resident.     Assesment: knee pain, now resolved    Plan: if symptoms return, try NSAID 600 PO TID x 2 weeks            If this doesn't improve symptoms then call my office and we can order MRI             F/u as needed    I agree with history, physical and imaging as well as the assessment and plan as detailed by Dr. Godoy.

## 2021-04-26 NOTE — NURSING NOTE
"Reason For Visit:   Chief Complaint   Patient presents with     Consult     right knee     Painful activities/movments: Full knee extension,  full flexion (lunge position), descending stairs is painful and twisting motions.    Reports knee pain during high school track and field.     ?  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  Work status?  Full time.    Date of injury: chronic  Type of injury: unknown LADI but is an active runner and notices pain correlates with activity level.    Date of surgery: NA  Type of surgery: NA.    Smoker: No  Request smoking cessation information: No    Pain Assessment  Patient Currently in Pain: Yes  0-10 Pain Scale: 4  Primary Pain Location: Knee    Ht 1.664 m (5' 5.5\")   Wt 54.9 kg (121 lb)   BMI 19.83 kg/m           Allergies   Allergen Reactions     Cats        No current outpatient medications on file.     No current facility-administered medications for this visit.          Kriss Chavarria, ATC    "

## 2021-04-26 NOTE — LETTER
4/26/2021         RE: Yessica Gar  [address] [address]        Dear Colleague,    Thank you for referring your patient, Yessica Gar, to the [facility] [place]. Please see a copy of my visit note below.    CHIEF CONCERN: Chronic bilateral knee pain and strain, right > left    HISTORY: Yessica Gar is a very pleasant 50 year old female with a no significant past medical history presents to clinic today for evaluation of the above chief concern.  Patient states that she has been having bilateral knee pain and strain, right greater than left, chronically since high school but now has been more frequent.  Her pain is located over the anterior aspect of both knees and is short-lived with a severity score of 4-8 out of 10 at worst.  Her activities are exacerbated by specific activities including walking her dog and being pulled.  In terms of treatment she has tried icing, ibuprofen, and knee sleeves.  She has not had any formal physical therapy or corticosteroid injection.  She has no other symptoms, no instability, no numbness or tingling.    PAST MEDICAL HISTORY: (Reviewed with the patient and in the [facility] medical record)  Past Medical History:   Diagnosis Date     Abnormal Pap smear of cervix 04/09/2021 04/09/21     Arthritis 1988     ASCUS favor benign 05, 07, 08    all neg HPV       PAST SURGICAL HISTORY: (Reviewed with the patient and in the [facility] medical record)  Past Surgical History:   Procedure Laterality Date     NO HISTORY OF SURGERY         MEDICATIONS: (Reviewed with the patient and in the [facility] medical record)  -- Notable medications include:  No current outpatient medications on file.    ALLERGIES: (Reviewed with the patient and in the [facility] medical record)  Cats    SOCIAL HISTORY: (Reviewed with the patient and in the medical record)  --Tobacco/Drugs: None  --Occupation:   --Avocation/Sport: Enjoys  exercise, yoga, walking her dogs  Social History     Socioeconomic History     Marital status:      Spouse name: Luis F     Number of children: 1     Years of education: Not on file     Highest education level: Not on file   Occupational History     Employer: [employer]   Social Needs     Financial resource strain: Not on file     Food insecurity     Worry: Not on file     Inability: Not on file     Transportation needs     Medical: Not on file     Non-medical: Not on file   Tobacco Use     Smoking status: Never Smoker     Smokeless tobacco: Never Used   Substance and Sexual Activity     Alcohol use: Yes     Comment: a couple times a month     Drug use: No     Sexual activity: Yes     Partners: Male     Birth control/protection: Condom   Lifestyle     Physical activity     Days per week: Not on file     Minutes per session: Not on file     Stress: Not on file   Relationships     Social connections     Talks on phone: Not on file     Gets together: Not on file     Attends Orthodox service: Not on file     Active member of club or organization: Not on file     Attends meetings of clubs or organizations: Not on file     Relationship status: Not on file     Intimate partner violence     Fear of current or ex partner: Not on file     Emotionally abused: Not on file     Physically abused: Not on file     Forced sexual activity: Not on file   Other Topics Concern     Parent/sibling w/ CABG, MI or angioplasty before 65F 55M? No   Social History Narrative    Social Documentation: 12/22/2009        Balanced Diet: YES    Calcium intake: 2-3 per day    Caffeine: 0-1 per day    Exercise:  type of activity no;  0 times per week    Sunscreen: Yes    Seatbelts:  Yes    Self Breast Exam:  No    Self Testicular Exam: n/a    Physical/Emotional/Sexual Abuse: No    Do you feel safe in your environment? Yes        Cholesterol screen up to date: No    Eye Exam up to date: Yes    Dental Exam up to date: Yes    Pap  "smear up to date: PAP   ASC-US   11/3/08    Mammogram up to date: No: needs referral    Dexa Scan up to date: Does Not Apply    Colonoscopy up to date: Does Not Apply    Immunizations up to date: Unknown    Glucose screen if over 40:  N/a        Misty Garrido CMA                                               FAMILY HISTORY: (Reviewed with the patient and in the medical record)  -- No family history of bleeding, clotting, or difficulty with anesthesia  family history includes Arthritis in her maternal grandmother; C.A.D. in her paternal grandfather; Cerebrovascular Disease in her father; Diabetes in her paternal grandmother; Gastrointestinal Disease in her father; Genetic Disorder in her mother; Hyperlipidemia in her father; Hypertension in her brother and father; Lipids in her brother and father.    REVIEW OF SYSTEMS: (Reviewed with the patient and on the health intake form)  -- A comprehensive 10 point review of systems was conducted and is negative except as noted in the HPI    EXAM:  General: Awake, Alert and Oriented, No acute Distress. Articulate and Interactive  Ht 1.664 m (5' 5.5\")   Wt 54.9 kg (121 lb)   BMI 19.83 kg/m    Body mass index is 19.83 kg/m .    Right lower extremity :    Skin is warm well perfused, no suggestion of infection    No prior surgical incisions    No effusion    Nontender to palpation over the medial and lateral joint lines, femoral condyles and tibial plateaus    Knee range of motion approximately 0 to 140 degrees of flexion    Lachman 1A, stable to anterior/posterior drawer test, stable with varus/valgus stress at 0 and 30 degrees of knee flexion    No pain or mechanical signs with Jose Ramon's test    EHL/FHL/TA/GS 5/5    Sensation intact L3-S1    Foot warm well perfused     IMAGING:  -- Plain Radiographs: Right knee x-rays 3 views AP/lateral/[place] 4/9/2021 personally reviewed.  No fracture or dislocation.  Preserved joint lines of the medial/lateral/patellofemoral compartments.  " No significant arthritic changes.    ASSESSMENT: 50 year old female with the following diagnoses:  1. Chronic activity related anterior pain and pulling sensation of bilateral knees, nonspecific diagnosis    PLAN:  1. We discussed the history, exam, and imaging findings consistent with the above assessment.  We explained that there is no clear explanation for her symptoms but that is reassuring she is asymptomatic at today's visit and that there are no indications for any procedure or surgery at this time.  2. Recommend ibuprofen as needed.  3. Activities as tolerated.  4. Follow-up as needed.    This patient was seen and discussed with Dr. Mujica.    Matheus Godoy MD, PhD  Orthopedic Surgery Resident, PGY5  [phone]      Patient seen and examined with the resident.     Assesment: knee pain, now resolved    Plan: if symptoms return, try NSAID 600 PO TID x 2 weeks            If this doesn't improve symptoms then call my office and we can order MRI             F/u as needed    I agree with history, physical and imaging as well as the assessment and plan as detailed by Dr. Godoy.           Bayron Mujica MD

## 2021-04-26 NOTE — PROGRESS NOTES
CHIEF CONCERN: Chronic bilateral knee pain and strain, right > left    HISTORY: Yessica Gar is a very pleasant 50 year old female with a no significant past medical history presents to clinic today for evaluation of the above chief concern.  Patient states that she has been having bilateral knee pain and strain, right greater than left, chronically since high school but now has been more frequent.  Her pain is located over the anterior aspect of both knees and is short-lived with a severity score of 4-8 out of 10 at worst.  Her activities are exacerbated by specific activities including walking her dog and being pulled.  In terms of treatment she has tried icing, ibuprofen, and knee sleeves.  She has not had any formal physical therapy or corticosteroid injection.  She has no other symptoms, no instability, no numbness or tingling.    PAST MEDICAL HISTORY: (Reviewed with the patient and in the [facility] medical record)  Past Medical History:   Diagnosis Date     Abnormal Pap smear of cervix 04/09/2021 04/09/21     Arthritis 1988     ASCUS favor benign 05, 07, 08    all neg HPV       PAST SURGICAL HISTORY: (Reviewed with the patient and in the [facility] medical record)  Past Surgical History:   Procedure Laterality Date     NO HISTORY OF SURGERY         MEDICATIONS: (Reviewed with the patient and in the [facility] medical record)  -- Notable medications include:  No current outpatient medications on file.    ALLERGIES: (Reviewed with the patient and in the [facility] medical record)  Cats    SOCIAL HISTORY: (Reviewed with the patient and in the medical record)  --Tobacco/Drugs: None  --Occupation:   --Avocation/Sport: Enjoys exercise, yoga, walking her dogs  Social History     Socioeconomic History     Marital status:      Spouse name: Luis F     Number of children: 1     Years of education: Not on file     Highest education level: Not on file   Occupational History     Employer: [employer]   Social Needs     Financial resource strain: Not on file     Food insecurity     Worry: Not on file     Inability: Not on file     Transportation needs     Medical: Not on file     Non-medical: Not on file   Tobacco Use     Smoking status: Never Smoker     Smokeless tobacco: Never Used   Substance and Sexual Activity     Alcohol use: Yes     Comment: a couple times a month     Drug use: No     Sexual activity: Yes     Partners: Male     Birth control/protection: Condom   Lifestyle     Physical activity     Days per week: Not on file     Minutes per session: Not on file     Stress: Not on file   Relationships     Social connections     Talks on phone: Not on file     Gets together: Not on file     Attends Advent service: Not on file     Active member of club or organization: Not on file     Attends meetings of clubs or organizations: Not on file     Relationship status: Not on file     Intimate partner violence     Fear of current or ex partner: Not on file     Emotionally abused: Not on file     Physically abused: Not on file     Forced sexual activity: Not on file   Other Topics Concern     Parent/sibling w/ CABG, MI or angioplasty before 65F 55M? No   Social History Narrative    Social Documentation: 12/22/2009        Balanced Diet: YES    Calcium intake: 2-3 per day    Caffeine: 0-1 per day    Exercise:  type of activity no;  0 times per week    Sunscreen: Yes    Seatbelts:  Yes    Self Breast Exam:  No    Self Testicular Exam: n/a    Physical/Emotional/Sexual Abuse: No    Do you feel safe in your environment? Yes        Cholesterol screen up to date: No    Eye Exam up to date: Yes    Dental Exam up to date: Yes    Pap smear up to date: PAP   ASC-US   11/3/08    Mammogram up to date: No: needs referral    Dexa Scan up to date: Does Not Apply    Colonoscopy up to date: Does Not Apply    Immunizations up to date: Unknown    Glucose screen if over 40:  N/a        Misty Garrido CMA                         "                       FAMILY HISTORY: (Reviewed with the patient and in the medical record)  -- No family history of bleeding, clotting, or difficulty with anesthesia  family history includes Arthritis in her maternal grandmother; C.A.D. in her paternal grandfather; Cerebrovascular Disease in her father; Diabetes in her paternal grandmother; Gastrointestinal Disease in her father; Genetic Disorder in her mother; Hyperlipidemia in her father; Hypertension in her brother and father; Lipids in her brother and father.    REVIEW OF SYSTEMS: (Reviewed with the patient and on the health intake form)  -- A comprehensive 10 point review of systems was conducted and is negative except as noted in the HPI    EXAM:  General: Awake, Alert and Oriented, No acute Distress. Articulate and Interactive  Ht 1.664 m (5' 5.5\")   Wt 54.9 kg (121 lb)   BMI 19.83 kg/m    Body mass index is 19.83 kg/m .    Right lower extremity :    Skin is warm well perfused, no suggestion of infection    No prior surgical incisions    No effusion    Nontender to palpation over the medial and lateral joint lines, femoral condyles and tibial plateaus    Knee range of motion approximately 0 to 140 degrees of flexion    Lachman 1A, stable to anterior/posterior drawer test, stable with varus/valgus stress at 0 and 30 degrees of knee flexion    No pain or mechanical signs with Jose Ramon's test    EHL/FHL/TA/GS 5/5    Sensation intact L3-S1    Foot warm well perfused     IMAGING:  -- Plain Radiographs: Right knee x-rays 3 views AP/lateral/[place] 4/9/2021 personally reviewed.  No fracture or dislocation.  Preserved joint lines of the medial/lateral/patellofemoral compartments.  No significant arthritic changes.    ASSESSMENT: 50 year old female with the following diagnoses:  1. Chronic activity related anterior pain and pulling sensation of bilateral knees, nonspecific diagnosis    PLAN:  1. We discussed the history, exam, and imaging findings consistent with " the above assessment.  We explained that there is no clear explanation for her symptoms but that is reassuring she is asymptomatic at today's visit and that there are no indications for any procedure or surgery at this time.  2. Recommend ibuprofen as needed.  3. Activities as tolerated.  4. Follow-up as needed.    This patient was seen and discussed with Dr. Mujica.    Matheus Godoy MD, PhD  Orthopedic Surgery Resident, PGY5  [phone]

## 2021-04-29 LAB — COLOGUARD-ABSTRACT: NEGATIVE

## 2021-07-30 ENCOUNTER — TRANSFERRED RECORDS (OUTPATIENT)
Dept: HEALTH INFORMATION MANAGEMENT | Facility: CLINIC | Age: 51
End: 2021-07-30

## 2021-09-18 ENCOUNTER — HEALTH MAINTENANCE LETTER (OUTPATIENT)
Age: 51
End: 2021-09-18

## 2021-12-12 ENCOUNTER — E-VISIT (OUTPATIENT)
Dept: FAMILY MEDICINE | Facility: CLINIC | Age: 51
End: 2021-12-12
Payer: COMMERCIAL

## 2021-12-12 DIAGNOSIS — F41.9 ANXIETY: Primary | ICD-10-CM

## 2021-12-12 PROCEDURE — 99207 PR NON-BILLABLE SERV PER CHARTING: CPT | Performed by: FAMILY MEDICINE

## 2021-12-12 ASSESSMENT — ANXIETY QUESTIONNAIRES
7. FEELING AFRAID AS IF SOMETHING AWFUL MIGHT HAPPEN: NEARLY EVERY DAY
GAD7 TOTAL SCORE: 18
2. NOT BEING ABLE TO STOP OR CONTROL WORRYING: MORE THAN HALF THE DAYS
8. IF YOU CHECKED OFF ANY PROBLEMS, HOW DIFFICULT HAVE THESE MADE IT FOR YOU TO DO YOUR WORK, TAKE CARE OF THINGS AT HOME, OR GET ALONG WITH OTHER PEOPLE?: VERY DIFFICULT
7. FEELING AFRAID AS IF SOMETHING AWFUL MIGHT HAPPEN: NEARLY EVERY DAY
5. BEING SO RESTLESS THAT IT IS HARD TO SIT STILL: MORE THAN HALF THE DAYS
4. TROUBLE RELAXING: NEARLY EVERY DAY
1. FEELING NERVOUS, ANXIOUS, OR ON EDGE: NEARLY EVERY DAY
GAD7 TOTAL SCORE: 18
6. BECOMING EASILY ANNOYED OR IRRITABLE: NEARLY EVERY DAY
GAD7 TOTAL SCORE: 18
3. WORRYING TOO MUCH ABOUT DIFFERENT THINGS: MORE THAN HALF THE DAYS

## 2021-12-13 ASSESSMENT — ANXIETY QUESTIONNAIRES: GAD7 TOTAL SCORE: 18

## 2021-12-20 NOTE — PROGRESS NOTES
Yessica is a 51 year old who is being evaluated via a billable video visit.      How would you like to obtain your AVS? Toni  If the video visit is dropped, the invitation should be resent by: Toni or else Text to cell phone: [phone]  Will anyone else be joining your video visit? No      Video Start Time: 2:15 pm     Assessment & Plan     Anxiety  We discussed the HRT and risks and side effects with them , césar estrogen patch, would need to take progesterone pill as well as she has her uterus , what to monitor etc , vs starting a low dose of lexapro , which will help with the hot flashes and anxiety as well , she will start at 5 mg for five days and then go to the 10 mg daily dose   - escitalopram (LEXAPRO) 10 MG tablet; Take 1 tablet (10 mg) by mouth daily  We will need to follow up in 4 to 5 weeks sooner if any concerns   Pt is aware  and comfortable with the current plan.    Menopausal syndrome (hot flashes)  Will start as above - continue to monitor , follow up in one month sooner if any concerns   - escitalopram (LEXAPRO) 10 MG tablet; Take 1 tablet (10 mg) by mouth daily    Follow up in four to five weeks sooner if any concerns   Pt is aware  and comfortable with the current plan.      Brenda Duckworth MD  [facility]   Yessica is a 51 year old who presents for the following health issues    HPI     Pt would like to discuss starting medication for menopausal sx's - has noticed worsened anxiety lately.    No flowsheet data found.  AMEENA-7  12/12/2021   1. Feeling nervous, anxious, or on edge 3   2. Not being able to stop or control worrying 2   3. Worrying too much about different things 2   4. Trouble relaxing 3   5. Being so restless that it is hard to sit still 2   6. Becoming easily annoyed or irritable 3   7. 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      Objective           Vitals:  No vitals were obtained today due to virtual visit.    Physical Exam   GENERAL: Healthy, alert and no distress  EYES: Eyes grossly normal to inspection.  No discharge or erythema, or obvious scleral/conjunctival abnormalities.  RESP: No audible wheeze, cough, or visible cyanosis.  No visible retractions or increased work of breathing.    SKIN: Visible skin clear. No significant rash, abnormal pigmentation or lesions.  NEURO: Cranial nerves grossly intact.  Mentation and speech appropriate for age.  PSYCH: Mentation appears normal, affect normal/bright, judgement and insight intact, normal speech and appearance well-groomed.    No results found for this or any previous visit (from the past 24 hour(s)).            Video-Visit Details    Type of service:  Video Visit    Video End Time:2:28 PM    Originating Location (pt. Location): Home    Distant Location (provider location):  [facility]     Platform used for Video Visit: [employer]

## 2021-12-21 ENCOUNTER — VIRTUAL VISIT (OUTPATIENT)
Dept: FAMILY MEDICINE | Facility: CLINIC | Age: 51
End: 2021-12-21
Payer: COMMERCIAL

## 2021-12-21 DIAGNOSIS — F41.9 ANXIETY: Primary | ICD-10-CM

## 2021-12-21 DIAGNOSIS — N95.1 MENOPAUSAL SYNDROME (HOT FLASHES): ICD-10-CM

## 2021-12-21 PROCEDURE — 99214 OFFICE O/P EST MOD 30 MIN: CPT | Mod: GT | Performed by: FAMILY MEDICINE

## 2021-12-21 RX ORDER — ESCITALOPRAM OXALATE 10 MG/1
10 TABLET ORAL DAILY
Qty: 30 TABLET | Refills: 1 | Status: SHIPPED | OUTPATIENT
Start: 2021-12-21 | End: 2022-11-22

## 2022-06-25 ENCOUNTER — HEALTH MAINTENANCE LETTER (OUTPATIENT)
Age: 52
End: 2022-06-25

## 2022-07-17 ENCOUNTER — MYC MEDICAL ADVICE (OUTPATIENT)
Dept: FAMILY MEDICINE | Facility: CLINIC | Age: 52
End: 2022-07-17

## 2022-07-17 DIAGNOSIS — F41.9 ANXIETY: Primary | ICD-10-CM

## 2022-07-18 RX ORDER — ESCITALOPRAM OXALATE 10 MG/1
10 TABLET ORAL DAILY
Qty: 30 TABLET | Refills: 1 | Status: SHIPPED | OUTPATIENT
Start: 2022-07-18 | End: 2022-08-24

## 2022-07-25 ENCOUNTER — MYC MEDICAL ADVICE (OUTPATIENT)
Dept: FAMILY MEDICINE | Facility: CLINIC | Age: 52
End: 2022-07-25

## 2022-07-26 NOTE — TELEPHONE ENCOUNTER
LS,  Please see below.  I assume she can take either tylenol or Ibuprofen but no liver or kidney labs done in recent years.  Ok to take either?  Please advise.  Thanks,  Louise Thakur RN

## 2022-07-27 NOTE — TELEPHONE ENCOUNTER
She can take tylenol and Ibuprofen is ok as long as it is not daily or too often   Allergy medication- Claritin  is ok , but not at the same time as the Lexapro.  Also , I need to see her ( at least a VV ) in one month since she just recently started the lexapro  Can you let her know this ?  Thanks

## 2022-08-18 ASSESSMENT — ANXIETY QUESTIONNAIRES
4. TROUBLE RELAXING: NOT AT ALL
7. FEELING AFRAID AS IF SOMETHING AWFUL MIGHT HAPPEN: SEVERAL DAYS
IF YOU CHECKED OFF ANY PROBLEMS ON THIS QUESTIONNAIRE, HOW DIFFICULT HAVE THESE PROBLEMS MADE IT FOR YOU TO DO YOUR WORK, TAKE CARE OF THINGS AT HOME, OR GET ALONG WITH OTHER PEOPLE: NOT DIFFICULT AT ALL
7. FEELING AFRAID AS IF SOMETHING AWFUL MIGHT HAPPEN: SEVERAL DAYS
GAD7 TOTAL SCORE: 2
5. BEING SO RESTLESS THAT IT IS HARD TO SIT STILL: NOT AT ALL
1. FEELING NERVOUS, ANXIOUS, OR ON EDGE: NOT AT ALL
2. NOT BEING ABLE TO STOP OR CONTROL WORRYING: NOT AT ALL
GAD7 TOTAL SCORE: 2
6. BECOMING EASILY ANNOYED OR IRRITABLE: NOT AT ALL
GAD7 TOTAL SCORE: 2
8. IF YOU CHECKED OFF ANY PROBLEMS, HOW DIFFICULT HAVE THESE MADE IT FOR YOU TO DO YOUR WORK, TAKE CARE OF THINGS AT HOME, OR GET ALONG WITH OTHER PEOPLE?: NOT DIFFICULT AT ALL
3. WORRYING TOO MUCH ABOUT DIFFERENT THINGS: SEVERAL DAYS

## 2022-08-24 ENCOUNTER — VIRTUAL VISIT (OUTPATIENT)
Dept: FAMILY MEDICINE | Facility: CLINIC | Age: 52
End: 2022-08-24
Payer: COMMERCIAL

## 2022-08-24 DIAGNOSIS — F41.9 ANXIETY: ICD-10-CM

## 2022-08-24 PROCEDURE — 99214 OFFICE O/P EST MOD 30 MIN: CPT | Mod: GT | Performed by: FAMILY MEDICINE

## 2022-08-24 RX ORDER — ESCITALOPRAM OXALATE 10 MG/1
10 TABLET ORAL DAILY
Qty: 30 TABLET | Refills: 3 | Status: SHIPPED | OUTPATIENT
Start: 2022-08-24 | End: 2023-04-24

## 2022-08-24 ASSESSMENT — PATIENT HEALTH QUESTIONNAIRE - PHQ9
10. IF YOU CHECKED OFF ANY PROBLEMS, HOW DIFFICULT HAVE THESE PROBLEMS MADE IT FOR YOU TO DO YOUR WORK, TAKE CARE OF THINGS AT HOME, OR GET ALONG WITH OTHER PEOPLE: NOT DIFFICULT AT ALL
SUM OF ALL RESPONSES TO PHQ QUESTIONS 1-9: 2
SUM OF ALL RESPONSES TO PHQ QUESTIONS 1-9: 2

## 2022-08-24 ASSESSMENT — ANXIETY QUESTIONNAIRES: GAD7 TOTAL SCORE: 2

## 2022-08-24 NOTE — PROGRESS NOTES
Yessica is a 51 year old who is being evaluated via a billable video visit.      How would you like to obtain your AVS? MyChart  If the video visit is dropped, the invitation should be resent by:   Will anyone else be joining your video visit? 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        No follow-ups on file.    Brenda Duckworth MD  [facility]   Yessica is a 51 year old, presenting for the following health issues:  No chief complaint on file.      History of Present Illness       Mental Health Follow-up:  Patient presents to follow-up on Anxiety.    Patient's anxiety since last visit has been:  Good  The patient is not having other symptoms associated with anxiety.  Any significant life events: other  Patient is not feeling anxious or having panic attacks.  Patient has no concerns about alcohol or drug use.    She eats 2-3 servings of fruits and vegetables daily.She consumes 0 sweetened beverage(s) daily.She exercises with enough effort to increase her heart rate 30 to 60 minutes per day.  She exercises with enough effort to increase her heart rate 6 days per week.   She is taking medications regularly.  Today's AMEENA-7 Score: 2       1) anxiety   2) hot flashes       Review of Systems   Constitutional, HEENT, cardiovascular, pulmonary, GI, , musculoskeletal, neuro, skin, endocrine and psych systems are negative, except as otherwise noted.       Objective           Vitals:  No vitals were obtained today due to virtual visit.    Physical Exam   GENERAL: Healthy, alert and no distress  EYES: Eyes grossly normal to inspection.  No discharge or erythema, or obvious scleral/conjunctival abnormalities.  RESP: No audible wheeze, cough, or visible cyanosis.  No visible retractions or increased work of breathing.    SKIN: Visible skin clear. No significant rash, abnormal pigmentation or lesions.  NEURO: Cranial nerves grossly intact.  Mentation and speech appropriate for age.  PSYCH: Mentation appears normal, affect normal/bright, judgement and insight intact, normal speech and appearance well-groomed.    No results found for this or any previous visit (from the past 24 hour(s)).            Video-Visit Details    Video Start Time: 2:50 pm     Type of service:  Video Visit    Video End Time:2:58 PM    Originating Location (pt. Location): Home    Distant Location (provider location):  [facility]     Platform used for Video Visit: [employer]    Winston Montero.

## 2022-09-27 ENCOUNTER — MYC MEDICAL ADVICE (OUTPATIENT)
Dept: FAMILY MEDICINE | Facility: CLINIC | Age: 52
End: 2022-09-27

## 2022-11-18 ENCOUNTER — MYC MEDICAL ADVICE (OUTPATIENT)
Dept: FAMILY MEDICINE | Facility: CLINIC | Age: 52
End: 2022-11-18

## 2022-11-18 ASSESSMENT — ENCOUNTER SYMPTOMS
COUGH: 0
HEMATURIA: 0
PALPITATIONS: 0
DYSURIA: 0
BREAST MASS: 0
MYALGIAS: 0
HEADACHES: 0
DIZZINESS: 0
HEARTBURN: 0
JOINT SWELLING: 1
HEMATOCHEZIA: 0
DIARRHEA: 0
ABDOMINAL PAIN: 0
NERVOUS/ANXIOUS: 0
WEAKNESS: 0
EYE PAIN: 0
SORE THROAT: 0
ARTHRALGIAS: 1
NAUSEA: 0
CHILLS: 0
SHORTNESS OF BREATH: 0
CONSTIPATION: 0
FEVER: 0
PARESTHESIAS: 0
FREQUENCY: 0

## 2022-11-22 ENCOUNTER — OFFICE VISIT (OUTPATIENT)
Dept: FAMILY MEDICINE | Facility: CLINIC | Age: 52
End: 2022-11-22
Payer: COMMERCIAL

## 2022-11-22 VITALS
RESPIRATION RATE: 16 BRPM | TEMPERATURE: 96.9 F | HEART RATE: 60 BPM | SYSTOLIC BLOOD PRESSURE: 109 MMHG | OXYGEN SATURATION: 99 % | HEIGHT: 66 IN | DIASTOLIC BLOOD PRESSURE: 70 MMHG | WEIGHT: 121.3 LBS | BODY MASS INDEX: 19.49 KG/M2

## 2022-11-22 DIAGNOSIS — F41.9 ANXIETY: ICD-10-CM

## 2022-11-22 DIAGNOSIS — N81.4 UTERINE PROLAPSE: ICD-10-CM

## 2022-11-22 DIAGNOSIS — Z00.00 ROUTINE GENERAL MEDICAL EXAMINATION AT A HEALTH CARE FACILITY: Primary | ICD-10-CM

## 2022-11-22 DIAGNOSIS — Z83.3 FAMILY HISTORY OF DIABETES MELLITUS: ICD-10-CM

## 2022-11-22 LAB
CHOLEST SERPL-MCNC: 236 MG/DL
DEPRECATED CALCIDIOL+CALCIFEROL SERPL-MC: 21 UG/L (ref 20–75)
HBA1C MFR BLD: 5.6 % (ref 0–5.6)
HCV AB SERPL QL IA: NONREACTIVE
HDLC SERPL-MCNC: 80 MG/DL
LDLC SERPL CALC-MCNC: 141 MG/DL
NONHDLC SERPL-MCNC: 156 MG/DL
TRIGL SERPL-MCNC: 74 MG/DL
TSH SERPL DL<=0.005 MIU/L-ACNC: 2.81 UIU/ML (ref 0.3–4.2)

## 2022-11-22 PROCEDURE — 82306 VITAMIN D 25 HYDROXY: CPT | Performed by: FAMILY MEDICINE

## 2022-11-22 PROCEDURE — 87624 HPV HI-RISK TYP POOLED RSLT: CPT | Performed by: FAMILY MEDICINE

## 2022-11-22 PROCEDURE — 84443 ASSAY THYROID STIM HORMONE: CPT | Performed by: FAMILY MEDICINE

## 2022-11-22 PROCEDURE — 99214 OFFICE O/P EST MOD 30 MIN: CPT | Mod: 25 | Performed by: FAMILY MEDICINE

## 2022-11-22 PROCEDURE — 99396 PREV VISIT EST AGE 40-64: CPT | Performed by: FAMILY MEDICINE

## 2022-11-22 PROCEDURE — 96127 BRIEF EMOTIONAL/BEHAV ASSMT: CPT | Performed by: FAMILY MEDICINE

## 2022-11-22 PROCEDURE — 36415 COLL VENOUS BLD VENIPUNCTURE: CPT | Performed by: FAMILY MEDICINE

## 2022-11-22 PROCEDURE — 86803 HEPATITIS C AB TEST: CPT | Performed by: FAMILY MEDICINE

## 2022-11-22 PROCEDURE — 83036 HEMOGLOBIN GLYCOSYLATED A1C: CPT | Performed by: FAMILY MEDICINE

## 2022-11-22 PROCEDURE — 80061 LIPID PANEL: CPT | Performed by: FAMILY MEDICINE

## 2022-11-22 PROCEDURE — G0145 SCR C/V CYTO,THINLAYER,RESCR: HCPCS | Performed by: FAMILY MEDICINE

## 2022-11-22 RX ORDER — ESCITALOPRAM OXALATE 20 MG/1
20 TABLET ORAL DAILY
Qty: 90 TABLET | Refills: 0 | Status: SHIPPED | OUTPATIENT
Start: 2022-11-22 | End: 2023-02-17

## 2022-11-22 ASSESSMENT — ANXIETY QUESTIONNAIRES
7. FEELING AFRAID AS IF SOMETHING AWFUL MIGHT HAPPEN: NOT AT ALL
3. WORRYING TOO MUCH ABOUT DIFFERENT THINGS: NOT AT ALL
4. TROUBLE RELAXING: NOT AT ALL
5. BEING SO RESTLESS THAT IT IS HARD TO SIT STILL: NOT AT ALL
8. IF YOU CHECKED OFF ANY PROBLEMS, HOW DIFFICULT HAVE THESE MADE IT FOR YOU TO DO YOUR WORK, TAKE CARE OF THINGS AT HOME, OR GET ALONG WITH OTHER PEOPLE?: NOT DIFFICULT AT ALL
6. BECOMING EASILY ANNOYED OR IRRITABLE: SEVERAL DAYS
GAD7 TOTAL SCORE: 1
1. FEELING NERVOUS, ANXIOUS, OR ON EDGE: NOT AT ALL
7. FEELING AFRAID AS IF SOMETHING AWFUL MIGHT HAPPEN: NOT AT ALL
IF YOU CHECKED OFF ANY PROBLEMS ON THIS QUESTIONNAIRE, HOW DIFFICULT HAVE THESE PROBLEMS MADE IT FOR YOU TO DO YOUR WORK, TAKE CARE OF THINGS AT HOME, OR GET ALONG WITH OTHER PEOPLE: NOT DIFFICULT AT ALL
GAD7 TOTAL SCORE: 1
GAD7 TOTAL SCORE: 1
2. NOT BEING ABLE TO STOP OR CONTROL WORRYING: NOT AT ALL

## 2022-11-22 ASSESSMENT — ENCOUNTER SYMPTOMS
ABDOMINAL PAIN: 0
DIARRHEA: 0
CHILLS: 0
NERVOUS/ANXIOUS: 0
COUGH: 0
HEMATURIA: 0
NAUSEA: 0
FREQUENCY: 0
ARTHRALGIAS: 1
PARESTHESIAS: 0
HEARTBURN: 0
DIZZINESS: 0
DYSURIA: 0
SHORTNESS OF BREATH: 0
PALPITATIONS: 0
EYE PAIN: 0
HEADACHES: 0
MYALGIAS: 0
JOINT SWELLING: 1
FEVER: 0
BREAST MASS: 0
WEAKNESS: 0
SORE THROAT: 0
CONSTIPATION: 0
HEMATOCHEZIA: 0

## 2022-11-22 ASSESSMENT — PATIENT HEALTH QUESTIONNAIRE - PHQ9
SUM OF ALL RESPONSES TO PHQ QUESTIONS 1-9: 2
10. IF YOU CHECKED OFF ANY PROBLEMS, HOW DIFFICULT HAVE THESE PROBLEMS MADE IT FOR YOU TO DO YOUR WORK, TAKE CARE OF THINGS AT HOME, OR GET ALONG WITH OTHER PEOPLE: NOT DIFFICULT AT ALL
SUM OF ALL RESPONSES TO PHQ QUESTIONS 1-9: 2

## 2022-11-22 ASSESSMENT — PAIN SCALES - GENERAL: PAINLEVEL: MODERATE PAIN (5)

## 2022-11-22 NOTE — PATIENT INSTRUCTIONS
Preventive Health Recommendations  Female Ages 50 - 64    Yearly exam: See your health care provider every year in order to  Review health changes.   Discuss preventive care.    Review your medicines if your doctor has prescribed any.    Get a Pap test every three years (unless you have an abnormal result and your provider advises testing more often).  If you get Pap tests with HPV test, you only need to test every 5 years, unless you have an abnormal result.   You do not need a Pap test if your uterus was removed (hysterectomy) and you have not had cancer.  You should be tested each year for STDs (sexually transmitted diseases) if you're at risk.   Have a mammogram every 1 to 2 years.  Have a colonoscopy at age 50, or have a yearly FIT test (stool test). These exams screen for colon cancer.    Have a cholesterol test every 5 years, or more often if advised.  Have a diabetes test (fasting glucose) every three years. If you are at risk for diabetes, you should have this test more often.   If you are at risk for osteoporosis (brittle bone disease), think about having a bone density scan (DEXA).    Shots: Get a flu shot each year. Get a tetanus shot every 10 years.    Nutrition:   Eat at least 5 servings of fruits and vegetables each day.  Eat whole-grain bread, whole-wheat pasta and brown rice instead of white grains and rice.  Get adequate Calcium and Vitamin D.     Lifestyle  Exercise at least 150 minutes a week (30 minutes a day, 5 days a week). This will help you control your weight and prevent disease.  Limit alcohol to one drink per day.  No smoking.   Wear sunscreen to prevent skin cancer.   See your dentist every six months for an exam and cleaning.  See your eye doctor every 1 to 2 years.  PLEASE CALL TO SCHEDULE YOUR MAMMOGRAM  [facility] [phone]  [facility] [phone]  [facility]   [phone]  Central Scheduling (all locations) [phone]    If  you are under/uninsured, we recommend you contact the [facility]. They offer mammograms on a sliding fee charge. You can schedule with them at [phone].

## 2022-11-22 NOTE — PROGRESS NOTES
SUBJECTIVE:   CC: Yessica is an 52 year old who presents for preventive health visit.   Patient has been advised of split billing requirements and indicates understanding: Yes  Healthy Habits:     Getting at least 3 servings of Calcium per day:  Yes    Bi-annual eye exam:  Yes    Dental care twice a year:  Yes    Sleep apnea or symptoms of sleep apnea:  None    Diet:  Vegetarian/vegan    Frequency of exercise:  4-5 days/week    Duration of exercise:  30-45 minutes    Taking medications regularly:  Yes    Medication side effects:  None    PHQ-2 Total Score: 0    Additional concerns today:  Yes  Answers for HPI/ROS submitted by the patient on 11/22/2022  If you checked off any problems, how difficult have these problems made it for you to do your work, take care of things at home, or get along with other people?: Not difficult at all  PHQ9 TOTAL SCORE: 2  AMEENA 7 TOTAL SCORE: 1            1) uterine prolapse , getting worse   2) anxiety on lexapro 10 mg , is getting more irritable lately , wondering about increase of the dose of lexapro     Today's PHQ-2 Score:   PHQ-2 ( 1999 [employer]) 11/18/2022   Q1: Little interest or pleasure in doing things 0   Q2: Feeling down, depressed or hopeless 0   PHQ-2 Score 0   PHQ-2 Total Score (12-17 Years)- Positive if 3 or more points; Administer PHQ-A if positive -   Q1: Little interest or pleasure in doing things Not at all   Q2: Feeling down, depressed or hopeless Not at all   PHQ-2 Score 0           Social History     Tobacco Use     Smoking status: Never     Smokeless tobacco: Never   Substance Use Topics     Alcohol use: Yes     Comment: a couple times a month     If you drink alcohol do you typically have >3 drinks per day or >7 drinks per week? No    No flowsheet data found.    Reviewed orders with patient.  Reviewed health maintenance and updated orders accordingly - Yes  Lab work is in process  Labs reviewed in EPIC  BP Readings from Last 3 Encounters:   11/22/22 109/70    21 118/75   20 111/70    Wt Readings from Last 3 Encounters:   22 55 kg (121 lb 4.8 oz)   21 54.9 kg (121 lb)   21 54.9 kg (121 lb)                  Patient Active Problem List   Diagnosis     ASCUS of cervix with negative high risk HPV     CARDIOVASCULAR SCREENING; LDL GOAL LESS THAN 160     SAB (spontaneous )     Anxiety     Menopausal syndrome (hot flashes)     Family history of diabetes mellitus     Uterine prolapse     Past Surgical History:   Procedure Laterality Date     NO HISTORY OF SURGERY         Social History     Tobacco Use     Smoking status: Never     Smokeless tobacco: Never   Substance Use Topics     Alcohol use: Yes     Comment: a couple times a month     Family History   Problem Relation Age of Onset     C.A.D. Paternal Grandfather      Hypertension Father      Lipids Father      Gastrointestinal Disease Father         Gall bladder removed (not due to stones)/polyps removed from colon age 60     Hyperlipidemia Father      Cerebrovascular Disease Father      Diabetes Paternal Grandmother      Arthritis Maternal Grandmother      Genetic Disorder Mother         autoimmune     Hypertension Brother      Lipids Brother      Coronary Artery Disease No family hx of      Hyperlipidemia No family hx of      Cerebrovascular Disease No family hx of      Breast Cancer No family hx of      Colon Cancer No family hx of      Prostate Cancer No family hx of      Other Cancer No family hx of      Depression No family hx of      Anxiety Disorder No family hx of      Mental Illness No family hx of      Substance Abuse No family hx of      Anesthesia Reaction No family hx of      Asthma No family hx of      Osteoporosis No family hx of      Genetic Disorder No family hx of      Thyroid Disease No family hx of      Obesity No family hx of      Unknown/Adopted No family hx of          Current Outpatient Medications   Medication Sig Dispense Refill     escitalopram (LEXAPRO) 10 MG  tablet Take 1 tablet (10 mg) by mouth daily Start with half a tablet daily for five days then one tablet daily 30 tablet 3     escitalopram (LEXAPRO) 20 MG tablet Take 1 tablet (20 mg) by mouth daily 90 tablet 0     Allergies   Allergen Reactions     Cats      Recent Labs   Lab Test 04/09/21  1434 01/06/15  0740   A1C 5.3  --    * 74   HDL 92 59   TRIG 68 87   TSH  --  2.81        Breast Cancer Screening:    Breast CA Risk Assessment (FHS-7) 4/8/2021   Do you have a family history of breast, colon, or ovarian cancer? No / Unknown         Mammogram Screening: Recommended annual mammography  Pertinent mammograms are reviewed under the imaging tab.    History of abnormal Pap smear: NO - age 30- 65 PAP every 3 years recommended  PAP / HPV Latest Ref Rng & Units 4/9/2021 7/24/2018 4/29/2014   PAP (Historical) - ASC-US(A) NIL NIL   HPV16 NEG:Negative Negative Negative -   HPV18 NEG:Negative Negative Negative -   HRHPV NEG:Negative Negative Negative -     Reviewed and updated as needed this visit by clinical staff   Tobacco  Allergies  Meds              Reviewed and updated as needed this visit by Provider                 Past Medical History:   Diagnosis Date     Abnormal Pap smear of cervix 04/09/2021 04/09/21     Arthritis 1988     ASCUS favor benign 05, 07, 08    all neg HPV      Past Surgical History:   Procedure Laterality Date     NO HISTORY OF SURGERY         Review of Systems   Constitutional: Negative for chills and fever.   HENT: Negative for congestion, ear pain, hearing loss and sore throat.    Eyes: Negative for pain and visual disturbance.   Respiratory: Negative for cough and shortness of breath.    Cardiovascular: Negative for chest pain, palpitations and peripheral edema.   Gastrointestinal: Negative for abdominal pain, constipation, diarrhea, heartburn, hematochezia and nausea.   Breasts:  Negative for tenderness, breast mass and discharge.   Genitourinary: Negative for dysuria, frequency,  "genital sores, hematuria, pelvic pain, urgency, vaginal bleeding and vaginal discharge.   Musculoskeletal: Positive for arthralgias and joint swelling. Negative for myalgias.   Skin: Negative for rash.   Neurological: Negative for dizziness, weakness, headaches and paresthesias.   Psychiatric/Behavioral: Negative for mood changes. The patient is not nervous/anxious.      CONSTITUTIONAL: NEGATIVE for fever, chills, change in weight  INTEGUMENTARY/SKIN: NEGATIVE for worrisome rashes, moles or lesions  EYES: NEGATIVE for vision changes or irritation  ENT: NEGATIVE for ear, mouth and throat problems  RESP: NEGATIVE for significant cough or SOB  BREAST: NEGATIVE for masses, tenderness or discharge  CV: NEGATIVE for chest pain, palpitations or peripheral edema  GI: NEGATIVE for nausea, abdominal pain, heartburn, or change in bowel habits  : NEGATIVE for unusual urinary or vaginal symptoms. No vaginal bleeding.  MUSCULOSKELETAL: NEGATIVE for significant arthralgias or myalgia  NEURO: NEGATIVE for weakness, dizziness or paresthesias  PSYCHIATRIC: NEGATIVE for changes in mood or affect      OBJECTIVE:   /70   Pulse 60   Temp 96.9  F (36.1  C) (Tympanic)   Resp 16   Ht 1.676 m (5' 6\")   Wt 55 kg (121 lb 4.8 oz)   LMP 07/04/2022 (Exact Date)   SpO2 99%   BMI 19.58 kg/m    Physical Exam  GENERAL: healthy, alert and no distress  EYES: Eyes grossly normal to inspection, PERRL and conjunctivae and sclerae normal  HENT: ear canals and TM's normal, nose and mouth without ulcers or lesions  NECK: no adenopathy, no asymmetry, masses, or scars and thyroid normal to palpation  RESP: lungs clear to auscultation - no rales, rhonchi or wheezes  BREAST: normal without masses, tenderness or nipple discharge and no palpable axillary masses or adenopathy  CV: regular rate and rhythm, normal S1 S2, no S3 or S4, no murmur, click or rub, no peripheral edema and peripheral pulses strong  ABDOMEN: soft, nontender, no " hepatosplenomegaly, no masses and bowel sounds normal   (female): normal female external genitalia, normal urethral meatus, vaginal mucosa pink, moist, well rugated, and normal cervix/adnexa/uterus without masses or discharge  MS: no gross musculoskeletal defects noted, no edema  SKIN: no suspicious lesions or rashes  NEURO: Normal strength and tone, mentation intact and speech normal  PSYCH: mentation appears normal, affect normal/bright    Diagnostic Test Results:  Labs reviewed in Epic  No results found for this or any previous visit (from the past 24 hour(s)).    ASSESSMENT/PLAN:   (Z00.00) Routine general medical examination at a health care facility  (primary encounter diagnosis)  Comment: she is fasting for lipids check   Plan: REVIEW OF HEALTH MAINTENANCE PROTOCOL ORDERS,         Hepatitis C Screen Reflex to HCV RNA Quant and         Genotype, Lipid panel reflex to direct LDL         Fasting, Pap screen with HPV - recommended age         30 - 65 years, TSH with free T4 reflex, Vitamin        D Deficiency        Discussed diet,calcium,exercise.Went over self breast exam.Thin prep was done.Eyes and teeth UTD.No immunizations needed today.See orders below for tests ordered and screening needed.'    (Z83.3) Family history of diabetes mellitus  Comment: will screen   Plan: Hemoglobin A1c            (F41.9) Anxiety  Comment: we discussed since recently there is more stress and some worsening anxiety , will increase the dose oft he lexapro to 20 mg   Plan: escitalopram (LEXAPRO) 20 MG tablet        Follow up in one month sooner if any concerns     (N81.4) Uterine prolapse  Comment: her uterine prolapse is getting worse lately , noticeable at the end of the day , able to palpate at the level of the introitus   Plan: Ob/Gyn Referral        Referral for GYn given to discuss possibility for surgery or other options       Patient has been advised of split billing requirements and indicates understanding:  Yes      COUNSELING:  Reviewed preventive health counseling, as reflected in patient instructions       Regular exercise       Healthy diet/nutrition       Vision screening       Hearing screening       (Fabiana)menopause management        She reports that she has never smoked. She has never used smokeless tobacco.      Brenda Duckworth MD  [facility]

## 2022-11-28 LAB
BKR LAB AP GYN ADEQUACY: NORMAL
BKR LAB AP GYN INTERPRETATION: NORMAL
BKR LAB AP HPV REFLEX: NORMAL
BKR LAB AP LMP: NORMAL
BKR LAB AP PREVIOUS ABNORMAL: NORMAL
PATH REPORT.COMMENTS IMP SPEC: NORMAL
PATH REPORT.COMMENTS IMP SPEC: NORMAL
PATH REPORT.RELEVANT HX SPEC: NORMAL

## 2022-11-30 LAB
HUMAN PAPILLOMA VIRUS 16 DNA: NEGATIVE
HUMAN PAPILLOMA VIRUS 18 DNA: NEGATIVE
HUMAN PAPILLOMA VIRUS FINAL DIAGNOSIS: NORMAL
HUMAN PAPILLOMA VIRUS OTHER HR: NEGATIVE

## 2023-02-16 DIAGNOSIS — F41.9 ANXIETY: ICD-10-CM

## 2023-02-17 RX ORDER — ESCITALOPRAM OXALATE 20 MG/1
20 TABLET ORAL DAILY
Qty: 90 TABLET | Refills: 1 | Status: SHIPPED | OUTPATIENT
Start: 2023-02-17 | End: 2023-09-06

## 2023-02-17 NOTE — TELEPHONE ENCOUNTER
"Requested Prescriptions   Pending Prescriptions Disp Refills     escitalopram (LEXAPRO) 20 MG tablet 90 tablet 0     Sig: Take 1 tablet (20 mg) by mouth daily       SSRIs Protocol Passed - 2/16/2023  4:39 PM        Passed - Recent (12 mo) or future (30 days) visit within the authorizing provider's specialty     Patient has had an office visit with the authorizing provider or a provider within the authorizing providers department within the previous 12 mos or has a future within next 30 days. See \"Patient Info\" tab in inbasket, or \"Choose Columns\" in Meds & Orders section of the refill encounter.              Passed - Medication is active on med list        Passed - Patient is age 18 or older        Passed - No active pregnancy on record        Passed - No positive pregnancy test in last 12 months           Prescription approved per [facility] Refill Protocol.    Minh Hinojosa RN   [facility]    "

## 2023-04-23 ENCOUNTER — MYC MEDICAL ADVICE (OUTPATIENT)
Dept: FAMILY MEDICINE | Facility: CLINIC | Age: 53
End: 2023-04-23
Payer: COMMERCIAL

## 2023-06-13 ENCOUNTER — OFFICE VISIT (OUTPATIENT)
Dept: FAMILY MEDICINE | Facility: CLINIC | Age: 53
End: 2023-06-13
Payer: COMMERCIAL

## 2023-06-13 VITALS
RESPIRATION RATE: 16 BRPM | WEIGHT: 123 LBS | HEIGHT: 66 IN | BODY MASS INDEX: 19.77 KG/M2 | OXYGEN SATURATION: 100 % | SYSTOLIC BLOOD PRESSURE: 125 MMHG | HEART RATE: 58 BPM | TEMPERATURE: 97.2 F | DIASTOLIC BLOOD PRESSURE: 82 MMHG

## 2023-06-13 DIAGNOSIS — K46.9 ENTEROCELE: ICD-10-CM

## 2023-06-13 DIAGNOSIS — Z23 ENCOUNTER FOR IMMUNIZATION: ICD-10-CM

## 2023-06-13 DIAGNOSIS — N81.4 UTEROVAGINAL PROLAPSE: ICD-10-CM

## 2023-06-13 DIAGNOSIS — Z01.818 PREOP GENERAL PHYSICAL EXAM: Primary | ICD-10-CM

## 2023-06-13 LAB
ERYTHROCYTE [DISTWIDTH] IN BLOOD BY AUTOMATED COUNT: 12 % (ref 10–15)
HCT VFR BLD AUTO: 40.3 % (ref 35–47)
HGB BLD-MCNC: 13.3 G/DL (ref 11.7–15.7)
MCH RBC QN AUTO: 30.4 PG (ref 26.5–33)
MCHC RBC AUTO-ENTMCNC: 33 G/DL (ref 31.5–36.5)
MCV RBC AUTO: 92 FL (ref 78–100)
PLATELET # BLD AUTO: 259 10E3/UL (ref 150–450)
RBC # BLD AUTO: 4.37 10E6/UL (ref 3.8–5.2)
WBC # BLD AUTO: 3.6 10E3/UL (ref 4–11)

## 2023-06-13 PROCEDURE — 90746 HEPB VACCINE 3 DOSE ADULT IM: CPT | Performed by: FAMILY MEDICINE

## 2023-06-13 PROCEDURE — 90472 IMMUNIZATION ADMIN EACH ADD: CPT | Performed by: FAMILY MEDICINE

## 2023-06-13 PROCEDURE — 99214 OFFICE O/P EST MOD 30 MIN: CPT | Mod: 25 | Performed by: FAMILY MEDICINE

## 2023-06-13 PROCEDURE — 90715 TDAP VACCINE 7 YRS/> IM: CPT | Performed by: FAMILY MEDICINE

## 2023-06-13 PROCEDURE — 90471 IMMUNIZATION ADMIN: CPT | Performed by: FAMILY MEDICINE

## 2023-06-13 PROCEDURE — 36415 COLL VENOUS BLD VENIPUNCTURE: CPT | Performed by: FAMILY MEDICINE

## 2023-06-13 PROCEDURE — 85027 COMPLETE CBC AUTOMATED: CPT | Performed by: FAMILY MEDICINE

## 2023-06-13 ASSESSMENT — ANXIETY QUESTIONNAIRES
IF YOU CHECKED OFF ANY PROBLEMS ON THIS QUESTIONNAIRE, HOW DIFFICULT HAVE THESE PROBLEMS MADE IT FOR YOU TO DO YOUR WORK, TAKE CARE OF THINGS AT HOME, OR GET ALONG WITH OTHER PEOPLE: NOT DIFFICULT AT ALL
7. FEELING AFRAID AS IF SOMETHING AWFUL MIGHT HAPPEN: NOT AT ALL
GAD7 TOTAL SCORE: 0
7. FEELING AFRAID AS IF SOMETHING AWFUL MIGHT HAPPEN: NOT AT ALL
3. WORRYING TOO MUCH ABOUT DIFFERENT THINGS: NOT AT ALL
1. FEELING NERVOUS, ANXIOUS, OR ON EDGE: NOT AT ALL
8. IF YOU CHECKED OFF ANY PROBLEMS, HOW DIFFICULT HAVE THESE MADE IT FOR YOU TO DO YOUR WORK, TAKE CARE OF THINGS AT HOME, OR GET ALONG WITH OTHER PEOPLE?: NOT DIFFICULT AT ALL
2. NOT BEING ABLE TO STOP OR CONTROL WORRYING: NOT AT ALL
4. TROUBLE RELAXING: NOT AT ALL
GAD7 TOTAL SCORE: 0
GAD7 TOTAL SCORE: 0
5. BEING SO RESTLESS THAT IT IS HARD TO SIT STILL: NOT AT ALL
6. BECOMING EASILY ANNOYED OR IRRITABLE: NOT AT ALL

## 2023-06-13 ASSESSMENT — PATIENT HEALTH QUESTIONNAIRE - PHQ9
SUM OF ALL RESPONSES TO PHQ QUESTIONS 1-9: 1
10. IF YOU CHECKED OFF ANY PROBLEMS, HOW DIFFICULT HAVE THESE PROBLEMS MADE IT FOR YOU TO DO YOUR WORK, TAKE CARE OF THINGS AT HOME, OR GET ALONG WITH OTHER PEOPLE: NOT DIFFICULT AT ALL
SUM OF ALL RESPONSES TO PHQ QUESTIONS 1-9: 1

## 2023-06-13 ASSESSMENT — PAIN SCALES - GENERAL: PAINLEVEL: NO PAIN (0)

## 2023-06-13 NOTE — PATIENT INSTRUCTIONS
For informational purposes only. Not to replace the advice of your health care provider. Copyright   2003,  [employer] [facility]. All rights reserved. Clinically reviewed by Marisela Flynn MD. [employer] [id number] - REV .  Preparing for Your Surgery  Getting started  A nurse will call you to review your health history and instructions. They will give you an arrival time based on your scheduled surgery time. Please be ready to share:  Your doctor's clinic name and phone number  Your medical, surgical, and anesthesia history  A list of allergies and sensitivities  A list of medicines, including herbal treatments and over-the-counter drugs  Whether the patient has a legal guardian (ask how to send us the papers in advance)  Please tell us if you're pregnant--or if there's any chance you might be pregnant. Some surgeries may injure a fetus (unborn baby), so they require a pregnancy test. Surgeries that are safe for a fetus don't always need a test, and you can choose whether to have one.   If you have a child who's having surgery, please ask for a copy of Preparing for Your Child's Surgery.    Preparing for surgery  Within 10 to 30 days of surgery: Have a pre-op exam (sometimes called an H&P, or History and Physical). This can be done at a clinic or pre-operative center.  If you're having a , you may not need this exam. Talk to your care team.  At your pre-op exam, talk to your care team about all medicines you take. If you need to stop any medicines before surgery, ask when to start taking them again.  We do this for your safety. Many medicines can make you bleed too much during surgery. Some change how well surgery (anesthesia) drugs work.  Call your insurance company to let them know you're having surgery. (If you don't have insurance, call [phone].)  Call your clinic if there's any change in your health. This includes signs of a cold or flu (sore throat, runny nose, cough, rash, fever). It  also includes a scrape or scratch near the surgery site.  If you have questions on the day of surgery, call your hospital or surgery center.  Eating and drinking guidelines  For your safety: Unless your surgeon tells you otherwise, follow the guidelines below.  Eat and drink as usual until 8 hours before you arrive for surgery. After that, no food or milk.  Drink clear liquids until 2 hours before you arrive. These are liquids you can see through, like water, Gatorade, and Propel Water. They also include plain black coffee and tea (no cream or milk), candy, and breath mints. You can spit out gum when you arrive.  If you drink alcohol: Stop drinking it the night before surgery.  If your care team tells you to take medicine on the morning of surgery, it's okay to take it with a sip of water.  Preventing infection  Shower or bathe the night before and morning of your surgery. Follow the instructions your clinic gave you. (If no instructions, use regular soap.)  Don't shave or clip hair near your surgery site. We'll remove the hair if needed.  Don't smoke or vape the morning of surgery. You may chew nicotine gum up to 2 hours before surgery. A nicotine patch is okay.  Note: Some surgeries require you to completely quit smoking and nicotine. Check with your surgeon.  Your care team will make every effort to keep you safe from infection. We will:  Clean our hands often with soap and water (or an alcohol-based hand rub).  Clean the skin at your surgery site with a special soap that kills germs.  Give you a special gown to keep you warm. (Cold raises the risk of infection.)  Wear special hair covers, masks, gowns and gloves during surgery.  Give antibiotic medicine, if prescribed. Not all surgeries need antibiotics.  What to bring on the day of surgery  Photo ID and insurance card  Copy of your health care directive, if you have one  Glasses and hearing aids (bring cases)  You can't wear contacts during surgery  Inhaler and  eye drops, if you use them (tell us about these when you arrive)  CPAP machine or breathing device, if you use them  A few personal items, if spending the night  If you have . . .  A pacemaker, ICD (cardiac defibrillator) or other implant: Bring the ID card.  An implanted stimulator: Bring the remote control.  A legal guardian: Bring a copy of the certified (court-stamped) guardianship papers.  Please remove any jewelry, including body piercings. Leave jewelry and other valuables at home.  If you're going home the day of surgery  You must have a responsible adult drive you home. They should stay with you overnight as well.  If you don't have someone to stay with you, and you aren't safe to go home alone, we may keep you overnight. Insurance often won't pay for this.  After surgery  If it's hard to control your pain or you need more pain medicine, please call your surgeon's office.  Questions?   If you have any questions for your care team, list them here: _________________________________________________________________________________________________________________________________________________________________________ ____________________________________ ____________________________________ ____________________________________    How to Take Your Medication Before Surgery  - Take all of your medications before surgery except as noted below  - skip Lexapro the day of surgery  stop Aspirin, Ibuprofen , fish oil, herbs 7 days before surgery

## 2023-06-13 NOTE — PROGRESS NOTES
[facility]  [address], [address]  [facility] [address]  Phone: [phone]  Primary Provider: Brenda Méndez  Pre-op Performing Provider: BRENDA MÉNDEZ      PREOPERATIVE EVALUATION:  Today's date: 6/13/2023    Yessica Gar is a 52 year old female who presents for a preoperative evaluation.      6/13/2023     7:19 AM   Additional Questions   Roomed by Barbra FELIX     Surgical Information:  Surgery/Procedure: LAPAROSCOPIC SUPRACERVICAL HYSTERECTOMY with BILATERAL SALPINGECTOMY, LAPAROSCOPIC SACROCOLPOPEXY, enterocele repair,  Possible MIDURETHRAL SLING, CYSTOSCOPY 69089, [id number], [id number]  Surgery Location: [facility]  Surgeon: Fawn Cevallos MD  Surgery Date: 06/28/2023  Time of Surgery: TBD  Where patient plans to recover: At home with family  Fax number for surgical facility: [phone]    Assessment & Plan     The proposed surgical procedure is considered INTERMEDIATE risk.    Preop general physical exam  Will check CBC   - CBC with platelets; Future  - CBC with platelets    Enterocele  Surgical correction     Uterovaginal prolapse  Will have laparoscopic surgery and prolapse correction done     Encounter for immunization  Got her tetanus booster and second hepatitis B vaccine   - TDAP VACCINE (Adacel, Boostrix)  - HEPATITIS B, ADULT (ENGERIX-B/RECOMBIVAX HB)            - No identified additional risk factors other than previously addressed    Antiplatelet or Anticoagulation Medication Instructions:   - Patient is on no antiplatelet or anticoagulation medications.    Additional Medication Instructions:  Patient is on no additional chronic medications  Stop NSAIDS, ASA and herbs 7 days prior to surgery , can skip Lexapro the morning of the surgery and resume after surgery       RECOMMENDATION:  APPROVAL GIVEN to proceed with proposed procedure, without further diagnostic evaluation.        Subjective       HPI related to upcoming procedure:         6/9/2023      8:31 PM   Preop Questions   1. Have you ever had a heart attack or stroke? No   2. Have you ever had surgery on your heart or blood vessels, such as a stent placement, a coronary artery bypass, or surgery on an artery in your head, neck, heart, or legs? No   3. Do you have chest pain with activity? No   4. Do you have a history of  heart failure? No   5. Do you currently have a cold, bronchitis or symptoms of other infection? No   6. Do you have a cough, shortness of breath, or wheezing? No   7. Do you or anyone in your family have previous history of blood clots? UNKNOWN - dad had a stroke    8. Do you or does anyone in your family have a serious bleeding problem such as prolonged bleeding following surgeries or cuts?  no    9. Have you ever had problems with anemia or been told to take iron pills? YES - during pregnancies only    10. Have you had any abnormal blood loss such as black, tarry or bloody stools, or abnormal vaginal bleeding? YES - during the miscarriage only    11. Have you ever had a blood transfusion? No   12. Are you willing to have a blood transfusion if it is medically needed before, during, or after your surgery? Yes   13. Have you or any of your relatives ever had problems with anesthesia? UNKNOWN - has not had any surgeries    14. Do you have sleep apnea, excessive snoring or daytime drowsiness? No   15. Do you have any artifical heart valves or other implanted medical devices like a pacemaker, defibrillator, or continuous glucose monitor? No   16. Do you have artificial joints? No   17. Are you allergic to latex? No   18. Is there any chance that you may be pregnant? No       Health Care Directive:  Patient does not have a Health Care Directive or Living Will:           Status of Chronic Conditions:  See problem list for active medical problems.  Problems all longstanding and stable, except as noted/documented.  See ROS for pertinent symptoms related to these conditions.      Review of  Systems  CONSTITUTIONAL: NEGATIVE for fever, chills, change in weight  INTEGUMENTARY/SKIN: NEGATIVE for worrisome rashes, moles or lesions  EYES: NEGATIVE for vision changes or irritation  ENT/MOUTH: NEGATIVE for ear, mouth and throat problems  RESP: NEGATIVE for significant cough or SOB  CV: NEGATIVE for chest pain, palpitations or peripheral edema  GI: NEGATIVE for nausea, abdominal pain, heartburn, or change in bowel habits  : NEGATIVE for frequency, dysuria, or hematuria  MUSCULOSKELETAL: NEGATIVE for significant arthralgias or myalgia  NEURO: NEGATIVE for weakness, dizziness or paresthesias  ENDOCRINE: NEGATIVE for temperature intolerance, skin/hair changes  HEME: NEGATIVE for bleeding problems  PSYCHIATRIC: NEGATIVE for changes in mood or affect    Patient Active Problem List    Diagnosis Date Noted     Family history of diabetes mellitus 2022     Priority: Medium     Uterine prolapse 2022     Priority: Medium     Anxiety 2021     Priority: Medium     Menopausal syndrome (hot flashes) 2021     Priority: Medium     SAB (spontaneous ) 2011     Priority: Medium     SAB 8/15/11 at approx 8 weeks.       CARDIOVASCULAR SCREENING; LDL GOAL LESS THAN 160 10/31/2010     Priority: Medium     ASCUS of cervix with negative high risk HPV 2010     Priority: Medium     10/05/05 ASCUS Pap, Neg HR HPV  11/15/06 NIL Pap  07 ASCUS Pap, Neg HR HPV  08 ASCUS Pap, Neg HR HPV  09 NIL Pap  08/3/10 NIL Pap  12 NIL Pap  14 NIL Pap  18: NIL pap, Neg HR HPV result. Plan routine screening.   21 ASCUS Pap, Neg HR HPV. Plan cotest in 3 years.   22 NIL, Neg HPV. Plan 3 yr co-test          Past Medical History:   Diagnosis Date     Abnormal Pap smear of cervix 2021     Arthritis 1988     ASCUS favor benign 05, 07, 08    all neg HPV     Past Surgical History:   Procedure Laterality Date     NO HISTORY OF SURGERY       Current  Outpatient Medications   Medication Sig Dispense Refill     escitalopram (LEXAPRO) 20 MG tablet Take 1 tablet (20 mg) by mouth daily 90 tablet 1       Allergies   Allergen Reactions     Cats         Social History     Tobacco Use     Smoking status: Never     Smokeless tobacco: Never   Vaping Use     Vaping status: Never Used   Substance Use Topics     Alcohol use: Yes     Comment: a couple times a month     Family History   Problem Relation Age of Onset     C.A.D. Paternal Grandfather      Hypertension Father      Lipids Father      Gastrointestinal Disease Father         Gall bladder removed (not due to stones)/polyps removed from colon age 60     Hyperlipidemia Father      Cerebrovascular Disease Father      Diabetes Paternal Grandmother      Arthritis Maternal Grandmother      Osteoporosis Maternal Grandmother      Genetic Disorder Mother         autoimmune     Hypertension Brother      Lipids Brother      Coronary Artery Disease No family hx of      Hyperlipidemia No family hx of      Cerebrovascular Disease No family hx of      Breast Cancer No family hx of      Colon Cancer No family hx of      Prostate Cancer No family hx of      Other Cancer No family hx of      Depression No family hx of      Anxiety Disorder No family hx of      Mental Illness No family hx of      Substance Abuse No family hx of      Anesthesia Reaction No family hx of      Asthma No family hx of      Genetic Disorder No family hx of      Thyroid Disease No family hx of      Obesity No family hx of      Unknown/Adopted No family hx of      History   Drug Use Unknown         Objective     There were no vitals taken for this visit.    Physical Exam    GENERAL APPEARANCE: healthy, alert and no distress     EYES: EOMI, PERRL     HENT: ear canals and TM's normal and nose and mouth without ulcers or lesions     NECK: no adenopathy, no asymmetry, masses, or scars and thyroid normal to palpation     RESP: lungs clear to auscultation - no rales,  rhonchi or wheezes     CV: regular rates and rhythm, normal S1 S2, no S3 or S4 and no murmur, click or rub     ABDOMEN:  soft, nontender, no HSM or masses and bowel sounds normal     MS: extremities normal- no gross deformities noted, no evidence of inflammation in joints, FROM in all extremities.     SKIN: no suspicious lesions or rashes     NEURO: Normal strength and tone, sensory exam grossly normal, mentation intact and speech normal     PSYCH: mentation appears normal. and affect normal/bright     LYMPHATICS: No cervical adenopathy    Recent Labs   Lab Test 11/22/22  0810   A1C 5.6        Diagnostics:  Labs pending at this time.  Results will be reviewed when available.   No EKG required, no history of coronary heart disease, significant arrhythmia, peripheral arterial disease or other structural heart disease.    Revised Cardiac Risk Index (RCRI):  The patient has the following serious cardiovascular risks for perioperative complications:   - No serious cardiac risks = 0 points            Signed Electronically by: Brenda Duckworth MD  Copy of this evaluation report is provided to requesting physician.      Answers for HPI/ROS submitted by the patient on 6/13/2023  If you checked off any problems, how difficult have these problems made it for you to do your work, take care of things at home, or get along with other people?: Not difficult at all  PHQ9 TOTAL SCORE: 1  AMEENA 7 TOTAL SCORE: 0

## 2023-06-13 NOTE — NURSING NOTE
Prior to immunization administration, verified patients identity using patient s name and date of birth. Please see Immunization Activity for additional information.     Screening Questionnaire for Adult Immunization    Are you sick today?   No   Do you have allergies to medications, food, a vaccine component or latex?   No   Have you ever had a serious reaction after receiving a vaccination?   No   Do you have a long-term health problem with heart, lung, kidney, or metabolic disease (e.g., diabetes), asthma, a blood disorder, no spleen, complement component deficiency, a cochlear implant, or a spinal fluid leak?  Are you on long-term aspirin therapy?   No   Do you have cancer, leukemia, HIV/AIDS, or any other immune system problem?   No   Do you have a parent, brother, or sister with an immune system problem?   No   In the past 3 months, have you taken medications that affect  your immune system, such as prednisone, other steroids, or anticancer drugs; drugs for the treatment of rheumatoid arthritis, Crohn s disease, or psoriasis; or have you had radiation treatments?   No   Have you had a seizure, or a brain or other nervous system problem?   No   During the past year, have you received a transfusion of blood or blood    products, or been given immune (gamma) globulin or antiviral drug?   No   For women: Are you pregnant or is there a chance you could become       pregnant during the next month?   No   Have you received any vaccinations in the past 4 weeks?   No     Immunization questionnaire answers were all negative.      Injection of hep b and tdap (adacel) given by Barbra Moreno. Patient instructed to remain in clinic for 15 minutes afterwards, and to report any adverse reactions.     Screening performed by Barbra Moreno on 6/13/2023 at 8:07 AM.

## 2023-07-05 ENCOUNTER — PATIENT OUTREACH (OUTPATIENT)
Dept: CARE COORDINATION | Facility: CLINIC | Age: 53
End: 2023-07-05
Payer: COMMERCIAL

## 2023-07-16 ENCOUNTER — MYC MEDICAL ADVICE (OUTPATIENT)
Dept: FAMILY MEDICINE | Facility: CLINIC | Age: 53
End: 2023-07-16
Payer: COMMERCIAL

## 2023-07-16 DIAGNOSIS — D72.819 LEUKOPENIA, UNSPECIFIED TYPE: Primary | ICD-10-CM

## 2023-07-17 NOTE — TELEPHONE ENCOUNTER
L.S.,  Please see below Partnered message and advise.  Pended if you approve  Thanks,  Meseret MCKEON RN

## 2023-07-25 ENCOUNTER — LAB (OUTPATIENT)
Dept: LAB | Facility: CLINIC | Age: 53
End: 2023-07-25
Payer: COMMERCIAL

## 2023-07-25 ENCOUNTER — PATIENT OUTREACH (OUTPATIENT)
Dept: ONCOLOGY | Facility: CLINIC | Age: 53
End: 2023-07-25

## 2023-07-25 DIAGNOSIS — D72.819 LEUKOPENIA, UNSPECIFIED TYPE: ICD-10-CM

## 2023-07-25 DIAGNOSIS — D72.819 LEUKOPENIA, UNSPECIFIED TYPE: Primary | ICD-10-CM

## 2023-07-25 LAB
ERYTHROCYTE [DISTWIDTH] IN BLOOD BY AUTOMATED COUNT: 12 % (ref 10–15)
HCT VFR BLD AUTO: 37.2 % (ref 35–47)
HGB BLD-MCNC: 12.3 G/DL (ref 11.7–15.7)
MCH RBC QN AUTO: 30.6 PG (ref 26.5–33)
MCHC RBC AUTO-ENTMCNC: 33.1 G/DL (ref 31.5–36.5)
MCV RBC AUTO: 93 FL (ref 78–100)
PLATELET # BLD AUTO: 250 10E3/UL (ref 150–450)
RBC # BLD AUTO: 4.02 10E6/UL (ref 3.8–5.2)
WBC # BLD AUTO: 3.6 10E3/UL (ref 4–11)

## 2023-07-25 PROCEDURE — 85027 COMPLETE CBC AUTOMATED: CPT

## 2023-07-25 PROCEDURE — 36415 COLL VENOUS BLD VENIPUNCTURE: CPT

## 2023-07-25 NOTE — PROGRESS NOTES
Referral received for benign heme services, see below.    Referral reason: Leukopenia, mild and stable    Current abnormal labs: Available in Chart Review    Outreach: Call not placed to patient regarding referral.    Plan: Triage instructions updated and sent to NPS for completion.

## 2023-07-27 ENCOUNTER — MYC MEDICAL ADVICE (OUTPATIENT)
Dept: FAMILY MEDICINE | Facility: CLINIC | Age: 53
End: 2023-07-27
Payer: COMMERCIAL

## 2023-07-30 NOTE — TELEPHONE ENCOUNTER
Referring Provider/Location:  Brenda Duckworth MD, [facility]  Dx and Code: Leukopenia  Appt Date:  8.2.23  Provider: Lelo Harper  July 30, 2023 2:56 PM TJ   Internal Referral, No outside records needed

## 2023-08-01 NOTE — PROGRESS NOTES
Video-Visit Details     Video Start Time: 10:47AM      Type of service:  Video Visit     Video End Time: 11:10AM    Originating Location (pt. Location): Home     Distant Location (provider location): Off-site     Platform used for Video Visit: [facility]    Hematology/Oncology New Patient Note      Today's Date: August 1, 2023     Referring provider: Brenda Duckworth MD   Reason for Consultation: persistant low WBC     HISTORY OF PRESENT ILLNESS: Yessica Gar is a 52 year old female who was referred to the Hematology/Oncology Clinic for leukopenia.     Patient has medical history including arthritis, hyperlipidemia, uterine vaginal prolapse s/p supracervical hysterectomy (no oopherectomy) 6/23, ASCUS, TMJ, anxiety      Regarding leukopenia:    There are no differentials available for WBC count  I have also checked care everywhere, no recent WBC counts or differentials  In 11/2004, patient had labs at [facility] showing WBC count 4.9, neutrophil percent 48.9, though ANC 2.4    Pt reports arthritis that is progressively worsening- initially in knees, now in elbows. She does workouts including HIT w/weight lifting, felt like tennis elbow, associated with joint swelling. No small joint pain or swelling or stiffness.     Pt reports a lot of autoimmune disorders in family- reports her mother had an autoimmune disorders, leukopenia, some disorder related to nails, inflammation of skin. Pts aunts also have some autoimmune d/o    Pt is not prone to infections, no frequent infections, does not require hospitalizations or antibiotics. 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 No oral sores, gingivitis.     Pt denies weight loss, night sweats, palpable lymphadenopathy, early satiety    Pt is a vegetarian for most of her life, does have dairy and eggs. She did recently start taking plant protein. She is not taking any particular vitamins  except vitamin D.     Pt denies recent travel in the past year, went to [place] in 4/2022.     Pt denies drug use including marijuana. Pt reports 1-2 glasses of wine per day since 7/23, no binge drinking episodes.     Pt denies new medications, no herbal supplements. Pt had hepatitis and shingles #2 vaccine in early 2023.     Though pts mother has some reported changes in nails, no confirmed family history for the following:  Fanconi anemia- Short stature, café au lait lesions, hypoplastic, microcephaly, hypergonadism  Telomere biology disorders-nail and hair changes, leukoplakia, rash, skin hypertrophy on hands and feet, esophageal strictures, liver and pulmonary fibrosis  Giulia-Blackfan anemia- macrocytic anemia, hypoproliferative with decreased reticulocytosis, microcephaly, sparse eyebrows, almond shaped eyes, micrognathia, hypothenar eminence, digitize thumbs  Severe congenital neutropenia-delayed cord separation, infection of umbilical cord stump  Shwachman giulia syndrome-pancreatic insufficiency, steatorrhea, increased LFTs, skeletal deformities, cardiac disorders, eczema, cognitive dysfunction, increased infections          REVIEW OF SYSTEMS:   A 14 point ROS was reviewed with pertinent positives and negatives in the HPI.        HOME MEDICATIONS:  Current Outpatient Medications   Medication Sig Dispense Refill    escitalopram (LEXAPRO) 20 MG tablet Take 1 tablet (20 mg) by mouth daily 90 tablet 1         ALLERGIES:  Allergies   Allergen Reactions    Cats          PAST MEDICAL HISTORY:  Past Medical History:   Diagnosis Date    Abnormal Pap smear of cervix 04/09/2021 04/09/21    Arthritis 1988    ASCUS favor benign 05, 07, 08    all neg HPV         PAST SURGICAL HISTORY:  Past Surgical History:   Procedure Laterality Date    NO HISTORY OF SURGERY           SOCIAL HISTORY:  Social History     Socioeconomic History    Marital status:      Spouse name: Luis F    Number of children: 1     Years of education: Not on file    Highest education level: Not on file   Occupational History     Employer: [employer]   Tobacco Use    Smoking status: Never    Smokeless tobacco: Never   Vaping Use    Vaping Use: Never used   Substance and Sexual Activity    Alcohol use: Yes     Comment: a couple times a month    Drug use: Never    Sexual activity: Yes     Partners: Male     Birth control/protection: Abstinence   Other Topics Concern    Parent/sibling w/ CABG, MI or angioplasty before 65F 55M? No   Social History Narrative    Social Documentation: 12/22/2009        Balanced Diet: YES    Calcium intake: 2-3 per day    Caffeine: 0-1 per day    Exercise:  type of activity no;  0 times per week    Sunscreen: Yes    Seatbelts:  Yes    Self Breast Exam:  No    Self Testicular Exam: n/a    Physical/Emotional/Sexual Abuse: No    Do you feel safe in your environment? Yes        Cholesterol screen up to date: No    Eye Exam up to date: Yes    Dental Exam up to date: Yes    Pap smear up to date: PAP   ASC-US   11/3/08    Mammogram up to date: No: needs referral    Dexa Scan up to date: Does Not Apply    Colonoscopy up to date: Does Not Apply    Immunizations up to date: Unknown    Glucose screen if over 40:  N/a        Misty Garrido CMA                                             Social Determinants of Health     Financial Resource Strain: Not on file   Food Insecurity: Not on file   Transportation Needs: Not on file   Physical Activity: Not on file   Stress: Not on file   Social Connections: Not on file   Intimate Partner Violence: Not on file   Housing Stability: Not on file         FAMILY HISTORY:  Family History   Problem Relation Age of Onset    C.A.D. Paternal Grandfather     Hypertension Father     Lipids Father     Gastrointestinal Disease Father         Gall bladder removed (not due to stones)/polyps removed from colon age 60    Hyperlipidemia Father     Cerebrovascular Disease Father     Diabetes Paternal  Grandmother     Arthritis Maternal Grandmother     Osteoporosis Maternal Grandmother     Genetic Disorder Mother         autoimmune    Hypertension Brother     Lipids Brother     Coronary Artery Disease No family hx of     Hyperlipidemia No family hx of     Cerebrovascular Disease No family hx of     Breast Cancer No family hx of     Colon Cancer No family hx of     Prostate Cancer No family hx of     Other Cancer No family hx of     Depression No family hx of     Anxiety Disorder No family hx of     Mental Illness No family hx of     Substance Abuse No family hx of     Anesthesia Reaction No family hx of     Asthma No family hx of     Genetic Disorder No family hx of     Thyroid Disease No family hx of     Obesity No family hx of     Unknown/Adopted No family hx of          PHYSICAL EXAM:  Vital signs:  There were no vitals taken for this visit.         LABS:  CBC RESULTS:   Recent Labs   Lab Test 07/25/23  0805   WBC 3.6*   RBC 4.02   HGB 12.3   HCT 37.2   MCV 93   MCH 30.6   MCHC 33.1   RDW 12.0          Recent Labs   Lab Test 04/09/21  1434   GLC 85         PATHOLOGY:      IMAGING:      ASSESSMENT/PLAN:  Yessica Gar is a 52 year old female with:    #Leukopenia  - Total WBC 3.6 since 6/23, no differentials available  -11/22 hepatitis C negative, TSH 2.81    PLAN:  Check the following labs:  CBC with differential  ESR, CRP, BELEN, rheumatoid factor, anti-CCP antibody  B12, B6, RBC folate, copper, zinc  TSH  CMP, LDH, peripheral smear  Hepatitis B, HIV  -Pending differential, if neutropenic and the above workup is negative, can consider checking Peripheral flow cytometry for LGL, RBC antigen for Charlton null associated neutropenia/benign ethnic neutropenia, abd US to rule out splenomegaly     #arthritis  - multiple joints, progressive  - rheumatology workup as above    RTC 8/23 for follow up with me    ADDENDUM:  BELEN positive 1:320, speckled w/nuclear dots  Will refer to rheumatology  Will order  abdominal US, r/o Felty syndrome   Will check peripheral blood flow cytometry and RBC antigens    Lelo Harper DO  Hematology/Oncology  [facility]    Future Appointments   Date Time Provider Department Center   8/2/2023 10:45 AM Lelo Harper DO [facility]

## 2023-08-02 ENCOUNTER — VIRTUAL VISIT (OUTPATIENT)
Dept: ONCOLOGY | Facility: CLINIC | Age: 53
End: 2023-08-02
Attending: FAMILY MEDICINE
Payer: COMMERCIAL

## 2023-08-02 ENCOUNTER — PATIENT OUTREACH (OUTPATIENT)
Dept: CARE COORDINATION | Facility: CLINIC | Age: 53
End: 2023-08-02

## 2023-08-02 ENCOUNTER — PRE VISIT (OUTPATIENT)
Dept: ONCOLOGY | Facility: CLINIC | Age: 53
End: 2023-08-02

## 2023-08-02 VITALS — BODY MASS INDEX: 19.93 KG/M2 | HEIGHT: 66 IN | WEIGHT: 124 LBS

## 2023-08-02 DIAGNOSIS — D72.819 LEUKOPENIA, UNSPECIFIED TYPE: Primary | ICD-10-CM

## 2023-08-02 DIAGNOSIS — M19.90 ARTHRITIS: ICD-10-CM

## 2023-08-02 DIAGNOSIS — D70.9 NEUTROPENIA, UNSPECIFIED TYPE (H): ICD-10-CM

## 2023-08-02 PROCEDURE — 99204 OFFICE O/P NEW MOD 45 MIN: CPT | Mod: VID | Performed by: INTERNAL MEDICINE

## 2023-08-02 ASSESSMENT — PAIN SCALES - GENERAL: PAINLEVEL: MILD PAIN (3)

## 2023-08-02 NOTE — Clinical Note
"    8/2/2023         RE: Yessica Gar  [address] [facility] [address]        Dear Colleague,    Thank you for referring your patient, Yessica Gar, to the [facility]. Please see a copy of my visit note below.    Virtual Visit Details    Type of service:  Video Visit     Originating Location (pt. Location): {video visit patient location:474382::\"Home\"}  {PROVIDER LOCATION On-site should be selected for visits conducted from your clinic location or adjoining [facility] hospital, academic office, or other nearby [facility] building. Off-site should be selected for all other provider locations, including home:[address]}  Distant Location (provider location):  {virtual location provider:342650}  Platform used for Video Visit: {Virtual Visit Platforms:944730::\"[email or web address]\"}    Video-Visit Details     Video Start Time:      Type of service:  Video Visit     Video End Time:     Originating Location (pt. Location): Home     Distant Location (provider location): Off-site     Platform used for Video Visit: [email or web address]     [facility]    Hematology/Oncology New Patient Note      Today's Date: August 1, 2023     Referring provider:Brenda Duckworth MD   Reason for Consultation: persistant low WBC     HISTORY OF PRESENT ILLNESS: Yessica Gar is a 52 year old female who was referred to the Hematology/Oncology Clinic for     Patient has medical history including hyperlipidemia, uterine vaginal prolapse s/p supracervical hysterectomy with BSO 6/23, ASCUS, anxiety      Regarding leukopenia:    There are no differentials available for WBC count  I have also checked care everywhere, no recent WBC counts or differentials  In 11/2004, patient had labs at [facility] showing WBC count 4.9, neutrophil percent 48.9, though ANC 2.4    Family history?  Previously told WBC is low?  Chronic illnesses?  Ulcers?  Gingivitis, infections,  Rheumatoid process?  ESR, CRP, BELEN, " rheumatoid factor, anti-CCP antibody-if positive, check abdominal ultrasound to rule out splenomegaly  B symptoms?  GI disorders or bariatric surgery that can cause impaired absorption  B12, RBC folate, copper, zinc, TSH, peripheral smear  Hepatitis B, hepatitis C, HIV, EBV  Occupational exposure  Travel history  Alcohol  Drugs  Vaccination  Medications  Fanconi anemia- Short stature, café au lait lesions, hypoplastic, microcephaly, hypergonadism  Telomere biology disorders-nail and hair changes, leukoplakia, rash, skin hypertrophy on hands and feet, esophageal strictures, liver and pulmonary fibrosis  Giulia-Blackfan anemia- macrocytic anemia, hypoproliferative with decreased reticulocytosis, microcephaly, sparse eyebrows, almond shaped eyes, micrognathia, hypothenar eminence, digitize thumbs  Severe congenital neutropenia-delayed cord separation, infection of umbilical cord stump  Shwachman giulia syndrome-pancreatic insufficiency, steatorrhea, increased LFTs, skeletal deformities, cardiac disorders, eczema, cognitive dysfunction, increased infections  Peripheral flow cytometry for LGL, RBC antigen for Charlton null associated neutropenia/benign ethnic neutropenia    #Leukopenia  - Total WBC 3.6 since 6/23, no differentials available  -11/22 hepatitis C negative, TSH 2.81    PLAN:  Check the following labs:  CBC with differential  ESR, CRP, BELEN, rheumatoid factor, anti-CCP antibody  B12, RBC folate, copper, zinc  TSH  CMP, LDH, peripheral smear  Hepatitis B, HIV, EBV  -Pending differential, can consider checking Peripheral flow cytometry for LGL, RBC antigen for Charlton null associated neutropenia/benign ethnic neutropenia    REVIEW OF SYSTEMS:   A 14 point ROS was reviewed with pertinent positives and negatives in the HPI.        HOME MEDICATIONS:  Current Outpatient Medications   Medication Sig Dispense Refill    escitalopram (LEXAPRO) 20 MG tablet Take 1 tablet (20 mg) by mouth daily 90 tablet 1          ALLERGIES:  Allergies   Allergen Reactions    Cats          PAST MEDICAL HISTORY:  Past Medical History:   Diagnosis Date    Abnormal Pap smear of cervix 04/09/2021 04/09/21    Arthritis 1988    ASCUS favor benign 05, 07, 08    all neg HPV         PAST SURGICAL HISTORY:  Past Surgical History:   Procedure Laterality Date    NO HISTORY OF SURGERY           SOCIAL HISTORY:  Social History     Socioeconomic History    Marital status:      Spouse name: Luis F    Number of children: 1    Years of education: Not on file    Highest education level: Not on file   Occupational History     Employer: [employer]   Tobacco Use    Smoking status: Never    Smokeless tobacco: Never   Vaping Use    Vaping Use: Never used   Substance and Sexual Activity    Alcohol use: Yes     Comment: a couple times a month    Drug use: Never    Sexual activity: Yes     Partners: Male     Birth control/protection: Abstinence   Other Topics Concern    Parent/sibling w/ CABG, MI or angioplasty before 65F 55M? No   Social History Narrative    Social Documentation: 12/22/2009        Balanced Diet: YES    Calcium intake: 2-3 per day    Caffeine: 0-1 per day    Exercise:  type of activity no;  0 times per week    Sunscreen: Yes    Seatbelts:  Yes    Self Breast Exam:  No    Self Testicular Exam: n/a    Physical/Emotional/Sexual Abuse: No    Do you feel safe in your environment? Yes        Cholesterol screen up to date: No    Eye Exam up to date: Yes    Dental Exam up to date: Yes    Pap smear up to date: PAP   ASC-US   11/3/08    Mammogram up to date: No: needs referral    Dexa Scan up to date: Does Not Apply    Colonoscopy up to date: Does Not Apply    Immunizations up to date: Unknown    Glucose screen if over 40:  N/a        Misty Garrido CMA                                             Social Determinants of Health     Financial Resource Strain: Not on file   Food Insecurity: Not on file   Transportation Needs: Not on  file   Physical Activity: Not on file   Stress: Not on file   Social Connections: Not on file   Intimate Partner Violence: Not on file   Housing Stability: Not on file         FAMILY HISTORY:  Family History   Problem Relation Age of Onset    C.A.D. Paternal Grandfather     Hypertension Father     Lipids Father     Gastrointestinal Disease Father         Gall bladder removed (not due to stones)/polyps removed from colon age 60    Hyperlipidemia Father     Cerebrovascular Disease Father     Diabetes Paternal Grandmother     Arthritis Maternal Grandmother     Osteoporosis Maternal Grandmother     Genetic Disorder Mother         autoimmune    Hypertension Brother     Lipids Brother     Coronary Artery Disease No family hx of     Hyperlipidemia No family hx of     Cerebrovascular Disease No family hx of     Breast Cancer No family hx of     Colon Cancer No family hx of     Prostate Cancer No family hx of     Other Cancer No family hx of     Depression No family hx of     Anxiety Disorder No family hx of     Mental Illness No family hx of     Substance Abuse No family hx of     Anesthesia Reaction No family hx of     Asthma No family hx of     Genetic Disorder No family hx of     Thyroid Disease No family hx of     Obesity No family hx of     Unknown/Adopted No family hx of          PHYSICAL EXAM:  Vital signs:  There were no vitals taken for this visit.   Physical Exam    ECOG: ***  GENERAL/CONSTITUTIONAL: No acute distress.  EYES: Pupils are equal and round. Extraocular movements intact without nystagmus.  No scleral icterus.  ENT/MOUTH: Neck supple. Oropharynx clear, no mucositis.  LYMPH: No submandibular, submental, anterior or posterior cervical, supraclavicular, axillary or inguinal *** adenopathy.   RESPIRATORY: Equal chest rise. Clear to auscultation bilaterally. No rhonchi, crackles or wheezes.   CARDIOVASCULAR: Regular rate and rhythm without murmurs, gallops, or rubs.  GASTROINTESTINAL: Abdomen soft,  non-tender, no distention. No hepatosplenomegaly or palpable masses. Normal bowel sounds. No rebound, guarding, rigidity.   MUSCULOSKELETAL: Warm and well-perfused, no cyanosis, clubbing, or edema.   NEUROLOGIC: Cranial nerves are grossly intact. Alert, oriented to person, place and time***, answers questions appropriately.  INTEGUMENTARY: No rashes or jaundice.  GAIT: Steady, does not use assistive device***      LABS:  CBC RESULTS:   Recent Labs   Lab Test 07/25/23  0805   WBC 3.6*   RBC 4.02   HGB 12.3   HCT 37.2   MCV 93   MCH 30.6   MCHC 33.1   RDW 12.0          Recent Labs   Lab Test 04/09/21  1434   GLC 85         PATHOLOGY:      IMAGING:      ASSESSMENT/PLAN:  Yessica Gar is a 52 year old female with:                Lelo Harper DO  Hematology/Oncology  [facility]    Future Appointments   Date Time Provider Department Center   8/2/2023 10:45 AM Lelo Harper DO [facility]        Video-Visit Details     Video Start Time: 10:47AM      Type of service:  Video Visit     Video End Time: 11:10AM    Originating Location (pt. Location): Home     Distant Location (provider location): Off-site     Platform used for Video Visit: [facility]    Hematology/Oncology New Patient Note      Today's Date: August 1, 2023     Referring provider: Brenda Duckworth MD   Reason for Consultation: persistant low WBC     HISTORY OF PRESENT ILLNESS: Yessica Gar is a 52 year old female who was referred to the Hematology/Oncology Clinic for leukopenia.     Patient has medical history including arthritis, hyperlipidemia, uterine vaginal prolapse s/p supracervical hysterectomy (no oopherectomy) 6/23, ASCUS, TMJ, anxiety      Regarding leukopenia:    There are no differentials available for WBC count  I have also checked care everywhere, no recent WBC counts or differentials  In 11/2004, patient had labs at [facility] showing WBC count 4.9,  neutrophil percent 48.9, though ANC 2.4    Pt reports arthritis that is progressively worsening- initially in knees, now in elbows. She does workouts including HIT w/weight lifting, felt like tennis elbow, associated with joint swelling. No small joint pain or swelling or stiffness.     Pt reports a lot of autoimmune disorders in family- reports her mother had an autoimmune disorders, leukopenia, some disorder related to nails, inflammation of skin. Pts aunts also have some autoimmune d/o    Pt is not prone to infections, no frequent infections, does not require hospitalizations or antibiotics. 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 No oral sores, gingivitis.     Pt denies weight loss, night sweats, palpable lymphadenopathy, early satiety    Pt is a vegetarian for most of her life, does have dairy and eggs. She did recently start taking plant protein. She is not taking any particular vitamins except vitamin D.     Pt denies recent travel in the past year, went to [place] in 4/2022.     Pt denies drug use including marijuana. Pt reports 1-2 glasses of wine per day since 7/23, no binge drinking episodes.     Pt denies new medications, no herbal supplements. Pt had hepatitis and shingles #2 vaccine in early 2023.     Though pts mother has some reported changes in nails, no confirmed family history for the following:  Fanconi anemia- Short stature, café au lait lesions, hypoplastic, microcephaly, hypergonadism  Telomere biology disorders-nail and hair changes, leukoplakia, rash, skin hypertrophy on hands and feet, esophageal strictures, liver and pulmonary fibrosis  Giulia-Blackfan anemia- macrocytic anemia, hypoproliferative with decreased reticulocytosis, microcephaly, sparse eyebrows, almond shaped eyes, micrognathia, hypothenar eminence, digitize thumbs  Severe congenital neutropenia-delayed cord separation, infection of umbilical cord stump  Roseanna  usha syndrome-pancreatic insufficiency, steatorrhea, increased LFTs, skeletal deformities, cardiac disorders, eczema, cognitive dysfunction, increased infections          REVIEW OF SYSTEMS:   A 14 point ROS was reviewed with pertinent positives and negatives in the HPI.        HOME MEDICATIONS:  Current Outpatient Medications   Medication Sig Dispense Refill     escitalopram (LEXAPRO) 20 MG tablet Take 1 tablet (20 mg) by mouth daily 90 tablet 1         ALLERGIES:  Allergies   Allergen Reactions     Cats          PAST MEDICAL HISTORY:  Past Medical History:   Diagnosis Date     Abnormal Pap smear of cervix 04/09/2021 04/09/21     Arthritis 1988     ASCUS favor benign 05, 07, 08    all neg HPV         PAST SURGICAL HISTORY:  Past Surgical History:   Procedure Laterality Date     NO HISTORY OF SURGERY           SOCIAL HISTORY:  Social History     Socioeconomic History     Marital status:      Spouse name: Luis F     Number of children: 1     Years of education: Not on file     Highest education level: Not on file   Occupational History     Employer: [employer]   Tobacco Use     Smoking status: Never     Smokeless tobacco: Never   Vaping Use     Vaping Use: Never used   Substance and Sexual Activity     Alcohol use: Yes     Comment: a couple times a month     Drug use: Never     Sexual activity: Yes     Partners: Male     Birth control/protection: Abstinence   Other Topics Concern     Parent/sibling w/ CABG, MI or angioplasty before 65F 55M? No   Social History Narrative    Social Documentation: 12/22/2009        Balanced Diet: YES    Calcium intake: 2-3 per day    Caffeine: 0-1 per day    Exercise:  type of activity no;  0 times per week    Sunscreen: Yes    Seatbelts:  Yes    Self Breast Exam:  No    Self Testicular Exam: n/a    Physical/Emotional/Sexual Abuse: No    Do you feel safe in your environment? Yes        Cholesterol screen up to date: No    Eye Exam up to date: Yes    Dental Exam  up to date: Yes    Pap smear up to date: PAP   ASC-US   11/3/08    Mammogram up to date: No: needs referral    Dexa Scan up to date: Does Not Apply    Colonoscopy up to date: Does Not Apply    Immunizations up to date: Unknown    Glucose screen if over 40:  N/a        Misty Garrido CMA                                             Social Determinants of Health     Financial Resource Strain: Not on file   Food Insecurity: Not on file   Transportation Needs: Not on file   Physical Activity: Not on file   Stress: Not on file   Social Connections: Not on file   Intimate Partner Violence: Not on file   Housing Stability: Not on file         FAMILY HISTORY:  Family History   Problem Relation Age of Onset     C.A.D. Paternal Grandfather      Hypertension Father      Lipids Father      Gastrointestinal Disease Father         Gall bladder removed (not due to stones)/polyps removed from colon age 60     Hyperlipidemia Father      Cerebrovascular Disease Father      Diabetes Paternal Grandmother      Arthritis Maternal Grandmother      Osteoporosis Maternal Grandmother      Genetic Disorder Mother         autoimmune     Hypertension Brother      Lipids Brother      Coronary Artery Disease No family hx of      Hyperlipidemia No family hx of      Cerebrovascular Disease No family hx of      Breast Cancer No family hx of      Colon Cancer No family hx of      Prostate Cancer No family hx of      Other Cancer No family hx of      Depression No family hx of      Anxiety Disorder No family hx of      Mental Illness No family hx of      Substance Abuse No family hx of      Anesthesia Reaction No family hx of      Asthma No family hx of      Genetic Disorder No family hx of      Thyroid Disease No family hx of      Obesity No family hx of      Unknown/Adopted No family hx of          PHYSICAL EXAM:  Vital signs:  There were no vitals taken for this visit.         LABS:  CBC RESULTS:   Recent Labs   Lab Test 07/25/23  0805   WBC 3.6*    RBC 4.02   HGB 12.3   HCT 37.2   MCV 93   MCH 30.6   MCHC 33.1   RDW 12.0          Recent Labs   Lab Test 04/09/21  1434   GLC 85         PATHOLOGY:      IMAGING:      ASSESSMENT/PLAN:  Yessica Gar is a 52 year old female with:    #Leukopenia  - Total WBC 3.6 since 6/23, no differentials available  -11/22 hepatitis C negative, TSH 2.81    PLAN:  Check the following labs:  CBC with differential  ESR, CRP, BELEN, rheumatoid factor, anti-CCP antibody  B12, B6, RBC folate, copper, zinc  TSH  CMP, LDH, peripheral smear  Hepatitis B, HIV  -Pending differential, if neutropenic and the above workup is negative, can consider checking Peripheral flow cytometry for LGL, RBC antigen for Charlton null associated neutropenia/benign ethnic neutropenia, abd US to rule out splenomegaly     #arthritis  - multiple joints, progressive  - rheumatology workup as above    RTC 8/23 for follow up with me        Neptali De Leon DO  Hematology/Oncology  [facility]    Future Appointments   Date Time Provider Department Center   8/2/2023 10:45 AM Neptali De Leon DO [facility]          Again, thank you for allowing me to participate in the care of your patient.        Sincerely,        NEPTALI DE LEON DO

## 2023-08-02 NOTE — Clinical Note
"    8/2/2023         RE: Yessica Gar  [address] [address]        Dear Colleague,    Thank you for referring your patient, Yessica Gar, to the [facility]. Please see a copy of my visit note below.    Virtual Visit Details    Type of service:  Video Visit     Originating Location (pt. Location): {video visit patient location:748620::\"Home\"}  {PROVIDER LOCATION On-site should be selected for visits conducted from your clinic location or adjoining [facility] hospital, academic office, or other nearby [facility] building. Off-site should be selected for all other provider locations, including home:[id number]}  Distant Location (provider location):  {virtual location provider:075597}  Platform used for Video Visit: {Virtual Visit Platforms:482542::\"Sojeans\"}    Video-Visit Details     Video Start Time:      Type of service:  Video Visit     Video End Time:     Originating Location (pt. Location): Home     Distant Location (provider location): Off-site     Platform used for Video Visit: Sojeans     [facility]    Hematology/Oncology New Patient Note      Today's Date: August 1, 2023     Referring provider:Brenda Duckworth MD   Reason for Consultation: persistant low WBC     HISTORY OF PRESENT ILLNESS: Yessica Gar is a 52 year old female who was referred to the Hematology/Oncology Clinic for     Patient has medical history including hyperlipidemia, uterine vaginal prolapse s/p supracervical hysterectomy with BSO 6/23, ASCUS, anxiety      Regarding leukopenia:    There are no differentials available for WBC count  I have also checked care everywhere, no recent WBC counts or differentials  In 11/2004, patient had labs at [facility] showing WBC count 4.9, neutrophil percent 48.9, though ANC 2.4    Family history?  Previously told WBC is low?  Chronic illnesses?  Ulcers?  Gingivitis, infections,  Rheumatoid process?  ESR, CRP, BELEN, " rheumatoid factor, anti-CCP antibody-if positive, check abdominal ultrasound to rule out splenomegaly  B symptoms?  GI disorders or bariatric surgery that can cause impaired absorption  B12, RBC folate, copper, zinc, TSH, peripheral smear  Hepatitis B, hepatitis C, HIV, EBV  Occupational exposure  Travel history  Alcohol  Drugs  Vaccination  Medications  Fanconi anemia- Short stature, café au lait lesions, hypoplastic, microcephaly, hypergonadism  Telomere biology disorders-nail and hair changes, leukoplakia, rash, skin hypertrophy on hands and feet, esophageal strictures, liver and pulmonary fibrosis  Giulia-Blackfan anemia- macrocytic anemia, hypoproliferative with decreased reticulocytosis, microcephaly, sparse eyebrows, almond shaped eyes, micrognathia, hypothenar eminence, digitize thumbs  Severe congenital neutropenia-delayed cord separation, infection of umbilical cord stump  Shwachman giulia syndrome-pancreatic insufficiency, steatorrhea, increased LFTs, skeletal deformities, cardiac disorders, eczema, cognitive dysfunction, increased infections  Peripheral flow cytometry for LGL, RBC antigen for Charlton null associated neutropenia/benign ethnic neutropenia    #Leukopenia  - Total WBC 3.6 since 6/23, no differentials available  -11/22 hepatitis C negative, TSH 2.81    PLAN:  Check the following labs:  CBC with differential  ESR, CRP, BELEN, rheumatoid factor, anti-CCP antibody  B12, RBC folate, copper, zinc  TSH  CMP, LDH, peripheral smear  Hepatitis B, HIV, EBV  -Pending differential, can consider checking Peripheral flow cytometry for LGL, RBC antigen for Charlton null associated neutropenia/benign ethnic neutropenia    REVIEW OF SYSTEMS:   A 14 point ROS was reviewed with pertinent positives and negatives in the HPI.        HOME MEDICATIONS:  Current Outpatient Medications   Medication Sig Dispense Refill    escitalopram (LEXAPRO) 20 MG tablet Take 1 tablet (20 mg) by mouth daily 90 tablet 1          ALLERGIES:  Allergies   Allergen Reactions    Cats          PAST MEDICAL HISTORY:  Past Medical History:   Diagnosis Date    Abnormal Pap smear of cervix 04/09/2021 04/09/21    Arthritis 1988    ASCUS favor benign 05, 07, 08    all neg HPV         PAST SURGICAL HISTORY:  Past Surgical History:   Procedure Laterality Date    NO HISTORY OF SURGERY           SOCIAL HISTORY:  Social History     Socioeconomic History    Marital status:      Spouse name: Luis F    Number of children: 1    Years of education: Not on file    Highest education level: Not on file   Occupational History     Employer: [employer]   Tobacco Use    Smoking status: Never    Smokeless tobacco: Never   Vaping Use    Vaping Use: Never used   Substance and Sexual Activity    Alcohol use: Yes     Comment: a couple times a month    Drug use: Never    Sexual activity: Yes     Partners: Male     Birth control/protection: Abstinence   Other Topics Concern    Parent/sibling w/ CABG, MI or angioplasty before 65F 55M? No   Social History Narrative    Social Documentation: 12/22/2009        Balanced Diet: YES    Calcium intake: 2-3 per day    Caffeine: 0-1 per day    Exercise:  type of activity no;  0 times per week    Sunscreen: Yes    Seatbelts:  Yes    Self Breast Exam:  No    Self Testicular Exam: n/a    Physical/Emotional/Sexual Abuse: No    Do you feel safe in your environment? Yes        Cholesterol screen up to date: No    Eye Exam up to date: Yes    Dental Exam up to date: Yes    Pap smear up to date: PAP   ASC-US   11/3/08    Mammogram up to date: No: needs referral    Dexa Scan up to date: Does Not Apply    Colonoscopy up to date: Does Not Apply    Immunizations up to date: Unknown    Glucose screen if over 40:  N/a        Misty Garrido CMA                                             Social Determinants of Health     Financial Resource Strain: Not on file   Food Insecurity: Not on file   Transportation Needs: Not on  file   Physical Activity: Not on file   Stress: Not on file   Social Connections: Not on file   Intimate Partner Violence: Not on file   Housing Stability: Not on file         FAMILY HISTORY:  Family History   Problem Relation Age of Onset    C.A.D. Paternal Grandfather     Hypertension Father     Lipids Father     Gastrointestinal Disease Father         Gall bladder removed (not due to stones)/polyps removed from colon age 60    Hyperlipidemia Father     Cerebrovascular Disease Father     Diabetes Paternal Grandmother     Arthritis Maternal Grandmother     Osteoporosis Maternal Grandmother     Genetic Disorder Mother         autoimmune    Hypertension Brother     Lipids Brother     Coronary Artery Disease No family hx of     Hyperlipidemia No family hx of     Cerebrovascular Disease No family hx of     Breast Cancer No family hx of     Colon Cancer No family hx of     Prostate Cancer No family hx of     Other Cancer No family hx of     Depression No family hx of     Anxiety Disorder No family hx of     Mental Illness No family hx of     Substance Abuse No family hx of     Anesthesia Reaction No family hx of     Asthma No family hx of     Genetic Disorder No family hx of     Thyroid Disease No family hx of     Obesity No family hx of     Unknown/Adopted No family hx of          PHYSICAL EXAM:  Vital signs:  There were no vitals taken for this visit.   Physical Exam    ECOG: ***  GENERAL/CONSTITUTIONAL: No acute distress.  EYES: Pupils are equal and round. Extraocular movements intact without nystagmus.  No scleral icterus.  ENT/MOUTH: Neck supple. Oropharynx clear, no mucositis.  LYMPH: No submandibular, submental, anterior or posterior cervical, supraclavicular, axillary or inguinal *** adenopathy.   RESPIRATORY: Equal chest rise. Clear to auscultation bilaterally. No rhonchi, crackles or wheezes.   CARDIOVASCULAR: Regular rate and rhythm without murmurs, gallops, or rubs.  GASTROINTESTINAL: Abdomen soft,  non-tender, no distention. No hepatosplenomegaly or palpable masses. Normal bowel sounds. No rebound, guarding, rigidity.   MUSCULOSKELETAL: Warm and well-perfused, no cyanosis, clubbing, or edema.   NEUROLOGIC: Cranial nerves are grossly intact. Alert, oriented to person, place and time***, answers questions appropriately.  INTEGUMENTARY: No rashes or jaundice.  GAIT: Steady, does not use assistive device***      LABS:  CBC RESULTS:   Recent Labs   Lab Test 07/25/23  0805   WBC 3.6*   RBC 4.02   HGB 12.3   HCT 37.2   MCV 93   MCH 30.6   MCHC 33.1   RDW 12.0          Recent Labs   Lab Test 04/09/21  1434   GLC 85         PATHOLOGY:      IMAGING:      ASSESSMENT/PLAN:  Yessica Gar is a 52 year old female with:                Lelo Harper DO  Hematology/Oncology  [facility]    Future Appointments   Date Time Provider Department Center   8/2/2023 10:45 AM Lelo Harper DO [facility]        Video-Visit Details     Video Start Time: 10:47AM      Type of service:  Video Visit     Video End Time: 11:10AM    Originating Location (pt. Location): Home     Distant Location (provider location): Off-site     Platform used for Video Visit: [facility]    Hematology/Oncology New Patient Note      Today's Date: August 1, 2023     Referring provider: Brenda Duckworth MD   Reason for Consultation: persistant low WBC     HISTORY OF PRESENT ILLNESS: Yessica Gar is a 52 year old female who was referred to the Hematology/Oncology Clinic for leukopenia.     Patient has medical history including arthritis, hyperlipidemia, uterine vaginal prolapse s/p supracervical hysterectomy (no oopherectomy) 6/23, ASCUS, TMJ, anxiety      Regarding leukopenia:    There are no differentials available for WBC count  I have also checked care everywhere, no recent WBC counts or differentials  In 11/2004, patient had labs at [facility] showing WBC count 4.9,  neutrophil percent 48.9, though ANC 2.4    Pt reports arthritis that is progressively worsening- initially in knees, now in elbows. She does workouts including HIT w/weight lifting, felt like tennis elbow, associated with joint swelling. No small joint pain or swelling or stiffness.     Pt reports a lot of autoimmune disorders in family- reports her mother had an autoimmune disorders, leukopenia, some disorder related to nails, inflammation of skin. Pts aunts also have some autoimmune d/o    Pt is not prone to infections, no frequent infections, does not require hospitalizations or antibiotics. 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 No oral sores, gingivitis.     Pt denies weight loss, night sweats, palpable lymphadenopathy, early satiety    Pt is a vegetarian for most of her life, does have dairy and eggs. She did recently start taking plant protein. She is not taking any particular vitamins except vitamin D.     Pt denies recent travel in the past year, went to [place] in 4/2022.     Pt denies drug use including marijuana. Pt reports 1-2 glasses of wine per day since 7/23, no binge drinking episodes.     Pt denies new medications, no herbal supplements. Pt had hepatitis and shingles #2 vaccine in early 2023.     Though pts mother has some reported changes in nails, no confirmed family history for the following:  Fanconi anemia- Short stature, café au lait lesions, hypoplastic, microcephaly, hypergonadism  Telomere biology disorders-nail and hair changes, leukoplakia, rash, skin hypertrophy on hands and feet, esophageal strictures, liver and pulmonary fibrosis  Giulia-Blackfan anemia- macrocytic anemia, hypoproliferative with decreased reticulocytosis, microcephaly, sparse eyebrows, almond shaped eyes, micrognathia, hypothenar eminence, digitize thumbs  Severe congenital neutropenia-delayed cord separation, infection of umbilical cord stump  Roseanna  usha syndrome-pancreatic insufficiency, steatorrhea, increased LFTs, skeletal deformities, cardiac disorders, eczema, cognitive dysfunction, increased infections          REVIEW OF SYSTEMS:   A 14 point ROS was reviewed with pertinent positives and negatives in the HPI.        HOME MEDICATIONS:  Current Outpatient Medications   Medication Sig Dispense Refill     escitalopram (LEXAPRO) 20 MG tablet Take 1 tablet (20 mg) by mouth daily 90 tablet 1         ALLERGIES:  Allergies   Allergen Reactions     Cats          PAST MEDICAL HISTORY:  Past Medical History:   Diagnosis Date     Abnormal Pap smear of cervix 04/09/2021 04/09/21     Arthritis 1988     ASCUS favor benign 05, 07, 08    all neg HPV         PAST SURGICAL HISTORY:  Past Surgical History:   Procedure Laterality Date     NO HISTORY OF SURGERY           SOCIAL HISTORY:  Social History     Socioeconomic History     Marital status:      Spouse name: Luis F     Number of children: 1     Years of education: Not on file     Highest education level: Not on file   Occupational History     Employer: [employer]   Tobacco Use     Smoking status: Never     Smokeless tobacco: Never   Vaping Use     Vaping Use: Never used   Substance and Sexual Activity     Alcohol use: Yes     Comment: a couple times a month     Drug use: Never     Sexual activity: Yes     Partners: Male     Birth control/protection: Abstinence   Other Topics Concern     Parent/sibling w/ CABG, MI or angioplasty before 65F 55M? No   Social History Narrative    Social Documentation: 12/22/2009        Balanced Diet: YES    Calcium intake: 2-3 per day    Caffeine: 0-1 per day    Exercise:  type of activity no;  0 times per week    Sunscreen: Yes    Seatbelts:  Yes    Self Breast Exam:  No    Self Testicular Exam: n/a    Physical/Emotional/Sexual Abuse: No    Do you feel safe in your environment? Yes        Cholesterol screen up to date: No    Eye Exam up to date: Yes    Dental Exam  up to date: Yes    Pap smear up to date: PAP   ASC-US   11/3/08    Mammogram up to date: No: needs referral    Dexa Scan up to date: Does Not Apply    Colonoscopy up to date: Does Not Apply    Immunizations up to date: Unknown    Glucose screen if over 40:  N/a        Misty Garrido CMA                                             Social Determinants of Health     Financial Resource Strain: Not on file   Food Insecurity: Not on file   Transportation Needs: Not on file   Physical Activity: Not on file   Stress: Not on file   Social Connections: Not on file   Intimate Partner Violence: Not on file   Housing Stability: Not on file         FAMILY HISTORY:  Family History   Problem Relation Age of Onset     C.A.D. Paternal Grandfather      Hypertension Father      Lipids Father      Gastrointestinal Disease Father         Gall bladder removed (not due to stones)/polyps removed from colon age 60     Hyperlipidemia Father      Cerebrovascular Disease Father      Diabetes Paternal Grandmother      Arthritis Maternal Grandmother      Osteoporosis Maternal Grandmother      Genetic Disorder Mother         autoimmune     Hypertension Brother      Lipids Brother      Coronary Artery Disease No family hx of      Hyperlipidemia No family hx of      Cerebrovascular Disease No family hx of      Breast Cancer No family hx of      Colon Cancer No family hx of      Prostate Cancer No family hx of      Other Cancer No family hx of      Depression No family hx of      Anxiety Disorder No family hx of      Mental Illness No family hx of      Substance Abuse No family hx of      Anesthesia Reaction No family hx of      Asthma No family hx of      Genetic Disorder No family hx of      Thyroid Disease No family hx of      Obesity No family hx of      Unknown/Adopted No family hx of          PHYSICAL EXAM:  Vital signs:  There were no vitals taken for this visit.         LABS:  CBC RESULTS:   Recent Labs   Lab Test 07/25/23  0805   WBC 3.6*    RBC 4.02   HGB 12.3   HCT 37.2   MCV 93   MCH 30.6   MCHC 33.1   RDW 12.0          Recent Labs   Lab Test 04/09/21  1434   GLC 85         PATHOLOGY:      IMAGING:      ASSESSMENT/PLAN:  Yessica Gar is a 52 year old female with:    #Leukopenia  - Total WBC 3.6 since 6/23, no differentials available  -11/22 hepatitis C negative, TSH 2.81    PLAN:  Check the following labs:  CBC with differential  ESR, CRP, BELEN, rheumatoid factor, anti-CCP antibody  B12, B6, RBC folate, copper, zinc  TSH  CMP, LDH, peripheral smear  Hepatitis B, HIV  -Pending differential, if neutropenic and the above workup is negative, can consider checking Peripheral flow cytometry for LGL, RBC antigen for Charlton null associated neutropenia/benign ethnic neutropenia, abd US to rule out splenomegaly     #arthritis  - multiple joints, progressive  - rheumatology workup as above    RTC 8/23 for follow up with me        Neptali De Leon DO  Hematology/Oncology  [facility]    Future Appointments   Date Time Provider Department Center   8/2/2023 10:45 AM Neptali De Leon DO [facility]          Again, thank you for allowing me to participate in the care of your patient.        Sincerely,        NEPTALI DE LEON DO

## 2023-08-03 ENCOUNTER — LAB (OUTPATIENT)
Dept: LAB | Facility: CLINIC | Age: 53
End: 2023-08-03
Payer: COMMERCIAL

## 2023-08-03 DIAGNOSIS — D72.819 LEUKOPENIA, UNSPECIFIED TYPE: ICD-10-CM

## 2023-08-03 DIAGNOSIS — M19.90 ARTHRITIS: ICD-10-CM

## 2023-08-03 LAB
ALBUMIN SERPL BCG-MCNC: 4.4 G/DL (ref 3.5–5.2)
ALP SERPL-CCNC: 70 U/L (ref 35–104)
ALT SERPL W P-5'-P-CCNC: 20 U/L (ref 0–50)
ANION GAP SERPL CALCULATED.3IONS-SCNC: 11 MMOL/L (ref 7–15)
AST SERPL W P-5'-P-CCNC: 28 U/L (ref 0–45)
BASOPHILS # BLD AUTO: 0 10E3/UL (ref 0–0.2)
BASOPHILS NFR BLD AUTO: 1 %
BILIRUB SERPL-MCNC: 0.5 MG/DL
BUN SERPL-MCNC: 10.8 MG/DL (ref 6–20)
CALCIUM SERPL-MCNC: 9.9 MG/DL (ref 8.6–10)
CHLORIDE SERPL-SCNC: 105 MMOL/L (ref 98–107)
CREAT SERPL-MCNC: 0.67 MG/DL (ref 0.51–0.95)
CRP SERPL-MCNC: <3 MG/L
DEPRECATED HCO3 PLAS-SCNC: 26 MMOL/L (ref 22–29)
EOSINOPHIL # BLD AUTO: 0.3 10E3/UL (ref 0–0.7)
EOSINOPHIL NFR BLD AUTO: 8 %
ERYTHROCYTE [DISTWIDTH] IN BLOOD BY AUTOMATED COUNT: 12 % (ref 10–15)
ERYTHROCYTE [SEDIMENTATION RATE] IN BLOOD BY WESTERGREN METHOD: 10 MM/HR (ref 0–30)
GFR SERPL CREATININE-BSD FRML MDRD: >90 ML/MIN/1.73M2
GLUCOSE SERPL-MCNC: 94 MG/DL (ref 70–99)
HBV CORE AB SERPL QL IA: NONREACTIVE
HBV SURFACE AB SERPL IA-ACNC: 1.22 M[IU]/ML
HBV SURFACE AB SERPL IA-ACNC: NONREACTIVE M[IU]/ML
HBV SURFACE AG SERPL QL IA: NONREACTIVE
HCT VFR BLD AUTO: 37.3 % (ref 35–47)
HGB BLD-MCNC: 12.6 G/DL (ref 11.7–15.7)
HIV 1+2 AB+HIV1 P24 AG SERPL QL IA: NONREACTIVE
IMM GRANULOCYTES # BLD: 0 10E3/UL
IMM GRANULOCYTES NFR BLD: 1 %
LDH SERPL L TO P-CCNC: 141 U/L (ref 0–250)
LYMPHOCYTES # BLD AUTO: 1.3 10E3/UL (ref 0.8–5.3)
LYMPHOCYTES NFR BLD AUTO: 35 %
MCH RBC QN AUTO: 31.2 PG (ref 26.5–33)
MCHC RBC AUTO-ENTMCNC: 33.8 G/DL (ref 31.5–36.5)
MCV RBC AUTO: 92 FL (ref 78–100)
MONOCYTES # BLD AUTO: 0.5 10E3/UL (ref 0–1.3)
MONOCYTES NFR BLD AUTO: 14 %
NEUTROPHILS # BLD AUTO: 1.5 10E3/UL (ref 1.6–8.3)
NEUTROPHILS NFR BLD AUTO: 41 %
NRBC # BLD AUTO: 0 10E3/UL
NRBC BLD AUTO-RTO: 0 /100
PLATELET # BLD AUTO: 230 10E3/UL (ref 150–450)
POTASSIUM SERPL-SCNC: 4.1 MMOL/L (ref 3.4–5.3)
PROT SERPL-MCNC: 7 G/DL (ref 6.4–8.3)
RBC # BLD AUTO: 4.04 10E6/UL (ref 3.8–5.2)
RETICS # AUTO: 0.05 10E6/UL (ref 0.03–0.1)
RETICS/RBC NFR AUTO: 1.3 % (ref 0.5–2)
SODIUM SERPL-SCNC: 142 MMOL/L (ref 136–145)
TSH SERPL DL<=0.005 MIU/L-ACNC: 2.32 UIU/ML (ref 0.3–4.2)
VIT B12 SERPL-MCNC: 473 PG/ML (ref 232–1245)
WBC # BLD AUTO: 3.6 10E3/UL (ref 4–11)

## 2023-08-03 PROCEDURE — 85060 BLOOD SMEAR INTERPRETATION: CPT | Performed by: PATHOLOGY

## 2023-08-03 PROCEDURE — 86200 CCP ANTIBODY: CPT

## 2023-08-03 PROCEDURE — 80053 COMPREHEN METABOLIC PANEL: CPT

## 2023-08-03 PROCEDURE — 83615 LACTATE (LD) (LDH) ENZYME: CPT

## 2023-08-03 PROCEDURE — 87389 HIV-1 AG W/HIV-1&-2 AB AG IA: CPT

## 2023-08-03 PROCEDURE — 84443 ASSAY THYROID STIM HORMONE: CPT

## 2023-08-03 PROCEDURE — 84630 ASSAY OF ZINC: CPT | Mod: 90

## 2023-08-03 PROCEDURE — 82747 ASSAY OF FOLIC ACID RBC: CPT | Mod: 90

## 2023-08-03 PROCEDURE — 86704 HEP B CORE ANTIBODY TOTAL: CPT

## 2023-08-03 PROCEDURE — 86039 ANTINUCLEAR ANTIBODIES (ANA): CPT

## 2023-08-03 PROCEDURE — 85652 RBC SED RATE AUTOMATED: CPT

## 2023-08-03 PROCEDURE — 82607 VITAMIN B-12: CPT

## 2023-08-03 PROCEDURE — 84207 ASSAY OF VITAMIN B-6: CPT | Mod: 90

## 2023-08-03 PROCEDURE — 86140 C-REACTIVE PROTEIN: CPT

## 2023-08-03 PROCEDURE — 85025 COMPLETE CBC W/AUTO DIFF WBC: CPT

## 2023-08-03 PROCEDURE — 85045 AUTOMATED RETICULOCYTE COUNT: CPT

## 2023-08-03 PROCEDURE — 82525 ASSAY OF COPPER: CPT | Mod: 90

## 2023-08-03 PROCEDURE — 86431 RHEUMATOID FACTOR QUANT: CPT

## 2023-08-03 PROCEDURE — 87340 HEPATITIS B SURFACE AG IA: CPT

## 2023-08-03 PROCEDURE — 36415 COLL VENOUS BLD VENIPUNCTURE: CPT

## 2023-08-03 PROCEDURE — 86706 HEP B SURFACE ANTIBODY: CPT

## 2023-08-03 PROCEDURE — 86038 ANTINUCLEAR ANTIBODIES: CPT

## 2023-08-04 LAB
ANA PAT SER IF-IMP: ABNORMAL
ANA PAT SER IF-IMP: ABNORMAL
ANA SER QL IF: POSITIVE
ANA TITR SER IF: ABNORMAL {TITER}
ANA TITR SER IF: ABNORMAL {TITER}
CCP AB SER IA-ACNC: 1.8 U/ML
COPPER SERPL-MCNC: 133.4 UG/DL
PATH REPORT.COMMENTS IMP SPEC: NORMAL
PATH REPORT.COMMENTS IMP SPEC: NORMAL
PATH REPORT.FINAL DX SPEC: NORMAL
PATH REPORT.MICROSCOPIC SPEC OTHER STN: NORMAL
PATH REPORT.MICROSCOPIC SPEC OTHER STN: NORMAL
PATH REPORT.RELEVANT HX SPEC: NORMAL
RHEUMATOID FACT SER NEPH-ACNC: 6 IU/ML
ZINC SERPL-MCNC: 97.8 UG/DL

## 2023-08-04 NOTE — NURSING NOTE
DISCHARGE PLAN:  Next appointments: See patient instruction section  Departure Mode: video  Accompanied by:    minutes for nursing discharge (face to face time)     Yessica Gar is here today for video follow up.  Patient was not seen by writing nurse at time of appointment.   Appointments scheduled for follow up and she already completed the labs. ee patient instructions and Oncologist's Progress note for further details. Questions and concerns addressed to patient's satisfaction. Patient verbalized and demonstrated understanding of plan.  Contact information provided and patient is encouraged to call with any that arise in the interim of care.    Seema Snowden  [facility]  [phone]  8/4/2023, 4:14 PM

## 2023-08-05 LAB
FOLATE RBC-MCNC: 551 NG/ML
HCT VFR BLD CALC: NORMAL %

## 2023-08-06 LAB — PYRIDOXAL PHOS SERPL-SCNC: 72.8 NMOL/L

## 2023-08-08 ENCOUNTER — TELEPHONE (OUTPATIENT)
Dept: ONCOLOGY | Facility: CLINIC | Age: 53
End: 2023-08-08
Payer: COMMERCIAL

## 2023-08-08 NOTE — TELEPHONE ENCOUNTER
Spoke patient regarding results and recommendations per Dr. Harper:    Patient was seen for leukopenia and did some rheumatology work up that has come back positive. I am putting in rheumatology referral and ordering abd US and additional labs. I would like her abd US and labs to be completed at least 1 week prior to her follow up appt with me on 8/23.    Patient verbalized understanding. Patient requesting referral be sent to [facility] due to insurance. This RN faxed referral to #[phone].    Yesy Mercer RNCC

## 2023-08-10 ENCOUNTER — LAB (OUTPATIENT)
Dept: LAB | Facility: CLINIC | Age: 53
End: 2023-08-10
Payer: COMMERCIAL

## 2023-08-10 ENCOUNTER — ANCILLARY PROCEDURE (OUTPATIENT)
Dept: ULTRASOUND IMAGING | Facility: CLINIC | Age: 53
End: 2023-08-10
Attending: INTERNAL MEDICINE
Payer: COMMERCIAL

## 2023-08-10 DIAGNOSIS — D70.9 NEUTROPENIA, UNSPECIFIED TYPE (H): ICD-10-CM

## 2023-08-10 LAB
FY SUP(A) AG RBC QL: POSITIVE
FY SUP(B) AG RBC QL: NEGATIVE
SPECIMEN EXPIRATION DATE: NORMAL

## 2023-08-10 PROCEDURE — 36415 COLL VENOUS BLD VENIPUNCTURE: CPT

## 2023-08-10 PROCEDURE — 86905 BLOOD TYPING RBC ANTIGENS: CPT

## 2023-08-10 PROCEDURE — 76705 ECHO EXAM OF ABDOMEN: CPT | Performed by: STUDENT IN AN ORGANIZED HEALTH CARE EDUCATION/TRAINING PROGRAM

## 2023-08-10 PROCEDURE — 88184 FLOWCYTOMETRY/ TC 1 MARKER: CPT | Mod: VID | Performed by: INTERNAL MEDICINE

## 2023-08-10 PROCEDURE — 88189 FLOWCYTOMETRY/READ 16 & >: CPT | Mod: VID | Performed by: INTERNAL MEDICINE

## 2023-08-10 PROCEDURE — 88185 FLOWCYTOMETRY/TC ADD-ON: CPT | Mod: VID | Performed by: INTERNAL MEDICINE

## 2023-08-11 LAB
PATH REPORT.COMMENTS IMP SPEC: NORMAL
PATH REPORT.FINAL DX SPEC: NORMAL
PATH REPORT.MICROSCOPIC SPEC OTHER STN: NORMAL
PATH REPORT.RELEVANT HX SPEC: NORMAL

## 2023-08-23 ENCOUNTER — VIRTUAL VISIT (OUTPATIENT)
Dept: ONCOLOGY | Facility: CLINIC | Age: 53
End: 2023-08-23
Payer: COMMERCIAL

## 2023-08-23 VITALS — HEIGHT: 66 IN | WEIGHT: 122 LBS | BODY MASS INDEX: 19.61 KG/M2

## 2023-08-23 DIAGNOSIS — M19.90 ARTHRITIS: ICD-10-CM

## 2023-08-23 DIAGNOSIS — R76.0 ANTINUCLEAR ANTIBODY (ANA) TITER GREATER THAN 1:80: ICD-10-CM

## 2023-08-23 DIAGNOSIS — D72.819 LEUKOPENIA, UNSPECIFIED TYPE: Primary | ICD-10-CM

## 2023-08-23 PROCEDURE — 99214 OFFICE O/P EST MOD 30 MIN: CPT | Mod: VID | Performed by: INTERNAL MEDICINE

## 2023-08-23 ASSESSMENT — PAIN SCALES - GENERAL: PAINLEVEL: NO PAIN (0)

## 2023-08-23 NOTE — NURSING NOTE
Is the patient currently in the state of [place]? YES    Visit mode:VIDEO    If the visit is dropped, the patient can be reconnected by: VIDEO VISIT: Send to e-mail at: [email or web address]    Will anyone else be joining the visit? NO  (If patient encounters technical issues they should call [phone])    How would you like to obtain your AVS? MyChart    Are changes needed to the allergy or medication list? No    Reason for visit: Hematology (Leukopenia), Video Visit (Follow Up ), and Results ( 08/10)    Cait MORENOF

## 2023-08-23 NOTE — LETTER
8/23/2023         RE: Yessica Gar  [address] [facility] [id number]      Video-Visit Details     Video Start Time: 4:23PM      Type of service:  Video Visit     Video End Time: 4:39PM    Originating Location (pt. Location): Home     Distant Location (provider location): Off-site     Platform used for Video Visit: [facility]    Hematology/Oncology Established Patient Follow-Up Note      Today's Date: 8/23/2023     Reason for follow-up: Leukopenia    HISTORY OF PRESENT ILLNESS: Yessica Gar is a 52 year old female who presents for follow-up    Patient has medical history including arthritis, hyperlipidemia, uterine vaginal prolapse s/p supracervical hysterectomy (no oopherectomy) 6/23, ASCUS, TMJ, anxiety      Regarding leukopenia:    There are no differentials available for WBC count  I have also checked care everywhere, no recent WBC counts or differentials  In 11/2004, patient had labs at [facility] showing WBC count 4.9, neutrophil percent 48.9, though ANC 2.4    Pt reports arthritis that is progressively worsening- initially in knees, now in elbows. She does workouts including HIT w/weight lifting, felt like tennis elbow, associated with joint swelling. No small joint pain or swelling or stiffness.     Pt reports a lot of autoimmune disorders in family- reports her mother had an autoimmune disorders, leukopenia, some disorder related to nails, inflammation of skin. Pts aunts also have some autoimmune d/o    Pt is not prone to infections, no frequent infections, does not require hospitalizations or antibiotics. 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 No oral sores, gingivitis.     Pt denies weight loss, night sweats, palpable lymphadenopathy, early satiety    Pt is a vegetarian for most of her life, does have dairy and eggs. She did recently start taking plant protein. She is not taking any  particular vitamins except vitamin D.     Pt denies recent travel in the past year, went to [place] in 4/2022.     Pt denies drug use including marijuana. Pt reports 1-2 glasses of wine per day since 7/23, no binge drinking episodes.     Pt denies new medications, no herbal supplements. Pt had hepatitis and shingles #2 vaccine in early 2023.     Though pts mother has some reported changes in nails, no confirmed family history for the following:  Fanconi anemia- Short stature, café au lait lesions, hypoplastic, microcephaly, hypergonadism  Telomere biology disorders-nail and hair changes, leukoplakia, rash, skin hypertrophy on hands and feet, esophageal strictures, liver and pulmonary fibrosis  Giulia-Blackfan anemia- macrocytic anemia, hypoproliferative with decreased reticulocytosis, microcephaly, sparse eyebrows, almond shaped eyes, micrognathia, hypothenar eminence, digitize thumbs  Severe congenital neutropenia-delayed cord separation, infection of umbilical cord stump  Shwachman giulia syndrome-pancreatic insufficiency, steatorrhea, increased LFTs, skeletal deformities, cardiac disorders, eczema, cognitive dysfunction, increased infections    INTERIM HISTORY:  Pt has been doing well since last visit  Pt is healing from surgery, s/p laparoscopic supracervical hysterectomy with bilateral salpingectomy, laparoscopic sacrocolpopexy, laparoscopic enterocele repair, midurethral sling via retropubic approach   Pt is back at work as a teacher  She denies recent fever, infection sx    REVIEW OF SYSTEMS:   A 14 point ROS was reviewed with pertinent positives and negatives in the HPI.        HOME MEDICATIONS:  Current Outpatient Medications   Medication Sig Dispense Refill     escitalopram (LEXAPRO) 20 MG tablet Take 1 tablet (20 mg) by mouth daily 90 tablet 1         ALLERGIES:  Allergies   Allergen Reactions     Cats          PAST MEDICAL HISTORY:  Past Medical History:   Diagnosis Date     Abnormal Pap smear of  cervix 04/09/2021 04/09/21     Arthritis 1988     ASCUS favor benign 05, 07, 08    all neg HPV         PAST SURGICAL HISTORY:  Past Surgical History:   Procedure Laterality Date     NO HISTORY OF SURGERY           SOCIAL HISTORY:  Social History     Socioeconomic History     Marital status:      Spouse name: Luis F     Number of children: 1     Years of education: Not on file     Highest education level: Not on file   Occupational History     Employer: [employer]   Tobacco Use     Smoking status: Never     Smokeless tobacco: Never   Vaping Use     Vaping Use: Never used   Substance and Sexual Activity     Alcohol use: Yes     Comment: a couple times a month     Drug use: Never     Sexual activity: Yes     Partners: Male     Birth control/protection: Abstinence   Other Topics Concern     Parent/sibling w/ CABG, MI or angioplasty before 65F 55M? No   Social History Narrative    Social Documentation: 12/22/2009        Balanced Diet: YES    Calcium intake: 2-3 per day    Caffeine: 0-1 per day    Exercise:  type of activity no;  0 times per week    Sunscreen: Yes    Seatbelts:  Yes    Self Breast Exam:  No    Self Testicular Exam: n/a    Physical/Emotional/Sexual Abuse: No    Do you feel safe in your environment? Yes        Cholesterol screen up to date: No    Eye Exam up to date: Yes    Dental Exam up to date: Yes    Pap smear up to date: PAP   ASC-US   11/3/08    Mammogram up to date: No: needs referral    Dexa Scan up to date: Does Not Apply    Colonoscopy up to date: Does Not Apply    Immunizations up to date: Unknown    Glucose screen if over 40:  N/a        Misty Garrido CMA                                             Social Determinants of Health     Financial Resource Strain: Not on file   Food Insecurity: Not on file   Transportation Needs: Not on file   Physical Activity: Not on file   Stress: Not on file   Social Connections: Not on file   Intimate Partner Violence: Not on file    Housing Stability: Not on file         FAMILY HISTORY:  Family History   Problem Relation Age of Onset     C.A.D. Paternal Grandfather      Hypertension Father      Lipids Father      Gastrointestinal Disease Father         Gall bladder removed (not due to stones)/polyps removed from colon age 60     Hyperlipidemia Father      Cerebrovascular Disease Father      Diabetes Paternal Grandmother      Arthritis Maternal Grandmother      Osteoporosis Maternal Grandmother      Genetic Disorder Mother         autoimmune     Hypertension Brother      Lipids Brother      Coronary Artery Disease No family hx of      Hyperlipidemia No family hx of      Cerebrovascular Disease No family hx of      Breast Cancer No family hx of      Colon Cancer No family hx of      Prostate Cancer No family hx of      Other Cancer No family hx of      Depression No family hx of      Anxiety Disorder No family hx of      Mental Illness No family hx of      Substance Abuse No family hx of      Anesthesia Reaction No family hx of      Asthma No family hx of      Genetic Disorder No family hx of      Thyroid Disease No family hx of      Obesity No family hx of      Unknown/Adopted No family hx of          PHYSICAL EXAM:  Vital signs:  There were no vitals taken for this visit.         LABS:   Latest Reference Range & Units 08/03/23 07:45   Sodium 136 - 145 mmol/L 142   Potassium 3.4 - 5.3 mmol/L 4.1   Chloride 98 - 107 mmol/L 105   Carbon Dioxide (CO2) 22 - 29 mmol/L 26   Urea Nitrogen 6.0 - 20.0 mg/dL 10.8   Creatinine 0.51 - 0.95 mg/dL 0.67   GFR Estimate >60 mL/min/1.73m2 >90   Calcium 8.6 - 10.0 mg/dL 9.9   Anion Gap 7 - 15 mmol/L 11   Albumin 3.5 - 5.2 g/dL 4.4   Protein Total 6.4 - 8.3 g/dL 7.0   Alkaline Phosphatase 35 - 104 U/L 70   ALT 0 - 50 U/L 20   AST 0 - 45 U/L 28   Bilirubin Total <=1.2 mg/dL 0.5   Copper 80.0 - 155.0 ug/dL 133.4   CRP Inflammation <5.00 mg/L <3.00   Cyclic Citrullinated Peptide Antibody, IgG <7.0 U/mL 1.8   RBC  FOLATE  Rpt   Glucose 70 - 99 mg/dL 94   Lactate Dehydrogenase 0 - 250 U/L 141   Rheumatoid Factor <12 IU/mL 6   TSH 0.30 - 4.20 uIU/mL 2.32   Vitamin B12 232 - 1,245 pg/mL 473   Vitamin B6 20.0 - 125.0 nmol/L 72.8   Zinc 60.0 - 120.0 ug/dL 97.8   WBC 4.0 - 11.0 10e3/uL 3.6 (L)   Hemoglobin 11.7 - 15.7 g/dL 12.6   Hematocrit 35.0 - 47.0 % 37.3   Platelet Count 150 - 450 10e3/uL 230   RBC Count 3.80 - 5.20 10e6/uL 4.04   MCV 78 - 100 fL 92   MCH 26.5 - 33.0 pg 31.2   MCHC 31.5 - 36.5 g/dL 33.8   RDW 10.0 - 15.0 % 12.0   % Neutrophils % 41   % Lymphocytes % 35   % Monocytes % 14   % Eosinophils % 8   % Basophils % 1   Absolute Basophils 0.0 - 0.2 10e3/uL 0.0   Absolute Eosinophils 0.0 - 0.7 10e3/uL 0.3   Absolute Immature Granulocytes <=0.4 10e3/uL 0.0   Absolute Lymphocytes 0.8 - 5.3 10e3/uL 1.3   Absolute Monocytes 0.0 - 1.3 10e3/uL 0.5   % Immature Granulocytes % 1   Absolute Neutrophils 1.6 - 8.3 10e3/uL 1.5 (L)   Absolute NRBCs 10e3/uL 0.0   NRBCs per 100 WBC <1 /100 0   % Retic 0.5 - 2.0 % 1.3   Absolute Retic 0.025 - 0.095 10e6/uL 0.052   Sed Rate 0 - 30 mm/hr 10   Hep B Surface Agn Nonreactive  Nonreactive   Hepatitis B Core Luisito Nonreactive  Nonreactive   Hepatitis B Surface Antibody Instrument Value <8.00 m[IU]/mL 1.22   Hepatitis B Surface Antibody  Nonreactive   HIV Antigen Antibody Combo Nonreactive  Nonreactive   ANTI NUCLEAR LUISITO IGG BY IFA WITH REFLEX  Rpt !   BLOOD MORPHOLOGY PATHOLOGIST REVIEW  Rpt   BELEN interpretation Negative  Positive !   BELEN pattern 1  Speckled   BELEN pattern 2  Nuclear dots   BELEN titer 1  1:320   BELEN titer 2  1:320   LAB BLOOD MORPHOLOGY PATHOLOGIST REVIEW  Rpt !   (L): Data is abnormally low  !: Data is abnormal  Rpt: View report in Results Review for more information    Case Report   Flow Cytometry Report                             Case: [id number]                                   Authorizing Provider:  Lelo Harper DO         Collected:           08/10/2023 09:19 AM            Ordering Location:     [facility]   Received:            08/10/2023 09:19 AM                                  [facility]                                                             Pathologist:           Candice Fisher MD                                                         Specimen:    Peripheral Blood                                                                          Flow Interpretation   A. Peripheral Blood:  -- Polytypic B cells  - No aberrant immunophenotype on T cells  - Rare-to-absent blasts      Electronically signed by Candice Fisher MD on 8/11/2023 at 12:26 PM   Comment    There is no immunophenotypic evidence of non-Hodgkin lymphoma, lymphoid leukemia, acute leukemia or another high-grade myeloid neoplasm. T cell lymphomas cannot always be detected by this flow cytometry assay. Final interpretation requires correlation with results of other ancillary studies, morphologic, and clinical features.    Flow Phenotypic Data    Unless otherwise indicated, percentages reported below are based on the total number of CD45 positive viable leukocytes. If applicable, percentage of plasma cells is from total viable nucleated cells.     4.0% polytypic B cells  33% T cells with a CD4:CD8 ratio of 2.7:1.   3.0% NK cells     Myeloid  blasts are rare to absent.        Case was reviewed by the following:  Pathology Fellow: Queta Golden MD  A resident/fellow was involved in the selection of testing, review of flow scattergrams, and/or interpretation of this case.  I, as the senior physician, attest that I: (i) confirmed appropriate testing, (ii) examined the relevant flow scattergrams for the specimen(s); and (ii) rendered or confirmed the interpretation(s).   Flow Processing Information    Multi-color flow analysis is performed for the following markers: CD2, CD3, CD4, CD5, CD7, CD8, CD10, CD13, CD14, CD16, CD19, CD20, CD34, CD38, CD45, CD56, CD57, CD64, , HLA-DR,  and kappa and lambda immunoglobulin light chains. Cells are gated to isolate populations (CD45 versus side scatter and forward scatter versus side scatter), to exclude debris (forward scatter versus side scatter) and to exclude cell doublets (forward scatter height versus forward scatter width and side scatter height versus side scatter width). Forward scatter varies with cell size. Side scatter varies with the amount of cytoplasmic granules. Intensity for CD45 usually increases as hematolymphoid cells mature.       Clinical Information    52 year-old woman with a history of arthritis, presenting for neutropenia.   FDA Disclaimer    This test was developed and its performance characteristics determined by the [facility]. It has not been cleared or approved by the US Food and Drug Administration.  FDA does not require this test to go through premarket FDA review. This test is used for clinical purposes and should not be regarded as investigational or for research. This laboratory is certified under the Clinical Laboratory Improvement Amendments (CLIA) as qualified to perform high complexity clinical laboratory testing.   Performing Labs    The technical component of this testing was completed at [facility]   Resulting Agency [facility]              Specimen Collected: 08/10/23  9:19 AM Last Resulted: 08/11/23 12:26 PM                08/10/23 09:19   SPECIMEN EXPIRATION DATE [id number]   Fya Antigen Type Positive   Fyb Antigen Type Negative   FLOW CYTOMETRY Rpt   Rpt: View report in Results Review for more information    PATHOLOGY:        IMAGING:  Narrative & Impression   EXAMINATION: Limited Abdominal Ultrasound, 8/10/2023 6:48 AM      COMPARISON: None.     HISTORY: Neutropenia, unspecified type; rule out splenomegaly     Technique: Limited grayscale, color and spectral Doppler ultrasound of  the  spleen was performed.     FINDINGS: Normal grayscale appearance of the spleen. No focal splenic  lesion. Patent splenic artery with normal waveforms and the hilum,  patent splenic vein with flow at the hilum. The spleen measures 11.7 x  4.7 x 11.6 cm                                                                      IMPRESSION: No splenomegaly.     ALEXY METZ MD           ASSESSMENT/PLAN:  Yessica Gar is a 52 year old female with:    #Leukopenia with mild neutropenia  - Total WBC 3.6 since 6/23, no differentials available  -11/22 hepatitis C negative, TSH 2.81  -8/23 labs  CBC: WBC 3.6, ANC 1.5, hemoglobin 12.6, platelet 230 K  ESR normal, CRP normal, BELEN positive 1:320 speckled with nuclear dots, rheumatoid factor normal, anti-CCP antibody negative  B12 473, B6 normal, RBC folate 551, copper normal, zinc normal  TSH 2.32  CMP: Total bilirubin normal, no protein gap,   peripheral smear: Slight leukopenia with neutropenia, lymphocytes polymorphous with no significant proportion of granular lymphocytes, neutrophils with unremarkable cytoplasmic granularity and nuclear morphology.  No definitive dysplasia among neutrophils  Hepatitis B negative, HIV negative  Peripheral blood flow cytometry: Polytypic B cells, no a variant immunophenotype on T cells, rare to absent blasts  RBC antigens: Fya antigen positive, Fyb antigen negative  - 8/23 abdominal ultrasound: Spleen 11.7 cm, no splenomegaly    PLAN:  - I believe the patient's mild neutropenia is related to rheumatologic etiology  - BELEN positive 1:320, speckled w/nuclear dots, rheumatology referral placed 8/2/2023 and pt plans to see rheumatology within [facility]in network  - Given neutropenia with positive BELEN, I checked an ultrasound to rule out splenomegaly and Felty syndrome, ultrasound did not show splenomegaly  -No other etiology found for patient's mild neutropenia, she does not have nutrient deficiencies, thyroid abnormalities,  morphologically abnormal lymphocytes or neutrophils, chronic infections with hepatitis or HIV, Charlton null associated neutropenia/benign ethnic neutropenia (which requires absence of both Fya and Fyb RBC antigens)  - No sinister etiology for patient's neutropenia found, peripheral blood flow cytometry is normal  - Can continue to monitor CBC with differential with PCP every 6 months and if significant cytopenias or recurrent infections or concern can return to hematology for further assessment  - pt offered follow up with me or PCP and she wishes to follow up with PCP which is fine    #arthritis  - multiple joints, progressive  - BELEN positive 1:320, speckled w/nuclear dots, rheumatology referral placed 8/2/2023    RTC as needed  Continue follow-up with PCP every 6 months for labs as detailed above        Neptali De Leon DO  Hematology/Oncology  [facility]             NEPTALI DE LEON DO

## 2023-08-23 NOTE — PROGRESS NOTES
Video-Visit Details     Video Start Time: 4:23PM      Type of service:  Video Visit     Video End Time: 4:39PM    Originating Location (pt. Location): Home     Distant Location (provider location): Off-site     Platform used for Video Visit: [facility]    Hematology/Oncology Established Patient Follow-Up Note      Today's Date: 8/23/2023     Reason for follow-up: Leukopenia    HISTORY OF PRESENT ILLNESS: Yessica Gar is a 52 year old female who presents for follow-up    Patient has medical history including arthritis, hyperlipidemia, uterine vaginal prolapse s/p supracervical hysterectomy (no oopherectomy) 6/23, ASCUS, TMJ, anxiety      Regarding leukopenia:    There are no differentials available for WBC count  I have also checked care everywhere, no recent WBC counts or differentials  In 11/2004, patient had labs at [facility] showing WBC count 4.9, neutrophil percent 48.9, though ANC 2.4    Pt reports arthritis that is progressively worsening- initially in knees, now in elbows. She does workouts including HIT w/weight lifting, felt like tennis elbow, associated with joint swelling. No small joint pain or swelling or stiffness.     Pt reports a lot of autoimmune disorders in family- reports her mother had an autoimmune disorders, leukopenia, some disorder related to nails, inflammation of skin. Pts aunts also have some autoimmune d/o    Pt is not prone to infections, no frequent infections, does not require hospitalizations or antibiotics. 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 No oral sores, gingivitis.     Pt denies weight loss, night sweats, palpable lymphadenopathy, early satiety    Pt is a vegetarian for most of her life, does have dairy and eggs. She did recently start taking plant protein. She is not taking any particular vitamins except vitamin D.     Pt denies recent travel in the past year, went to [facility]  [place] in 4/2022.     Pt denies drug use including marijuana. Pt reports 1-2 glasses of wine per day since 7/23, no binge drinking episodes.     Pt denies new medications, no herbal supplements. Pt had hepatitis and shingles #2 vaccine in early 2023.     Though pts mother has some reported changes in nails, no confirmed family history for the following:  Fanconi anemia- Short stature, café au lait lesions, hypoplastic, microcephaly, hypergonadism  Telomere biology disorders-nail and hair changes, leukoplakia, rash, skin hypertrophy on hands and feet, esophageal strictures, liver and pulmonary fibrosis  Giulia-Blackfan anemia- macrocytic anemia, hypoproliferative with decreased reticulocytosis, microcephaly, sparse eyebrows, almond shaped eyes, micrognathia, hypothenar eminence, digitize thumbs  Severe congenital neutropenia-delayed cord separation, infection of umbilical cord stump  Shwachman giulia syndrome-pancreatic insufficiency, steatorrhea, increased LFTs, skeletal deformities, cardiac disorders, eczema, cognitive dysfunction, increased infections    INTERIM HISTORY:  Pt has been doing well since last visit  Pt is healing from surgery, s/p laparoscopic supracervical hysterectomy with bilateral salpingectomy, laparoscopic sacrocolpopexy, laparoscopic enterocele repair, midurethral sling via retropubic approach   Pt is back at work as a teacher  She denies recent fever, infection sx    REVIEW OF SYSTEMS:   A 14 point ROS was reviewed with pertinent positives and negatives in the HPI.        HOME MEDICATIONS:  Current Outpatient Medications   Medication Sig Dispense Refill    escitalopram (LEXAPRO) 20 MG tablet Take 1 tablet (20 mg) by mouth daily 90 tablet 1         ALLERGIES:  Allergies   Allergen Reactions    Cats          PAST MEDICAL HISTORY:  Past Medical History:   Diagnosis Date    Abnormal Pap smear of cervix 04/09/2021 04/09/21    Arthritis 1988    ASCUS favor benign 05, 07, 08    all neg HPV          PAST SURGICAL HISTORY:  Past Surgical History:   Procedure Laterality Date    NO HISTORY OF SURGERY           SOCIAL HISTORY:  Social History     Socioeconomic History    Marital status:      Spouse name: Luis F    Number of children: 1    Years of education: Not on file    Highest education level: Not on file   Occupational History     Employer: [employer]   Tobacco Use    Smoking status: Never    Smokeless tobacco: Never   Vaping Use    Vaping Use: Never used   Substance and Sexual Activity    Alcohol use: Yes     Comment: a couple times a month    Drug use: Never    Sexual activity: Yes     Partners: Male     Birth control/protection: Abstinence   Other Topics Concern    Parent/sibling w/ CABG, MI or angioplasty before 65F 55M? No   Social History Narrative    Social Documentation: 12/22/2009        Balanced Diet: YES    Calcium intake: 2-3 per day    Caffeine: 0-1 per day    Exercise:  type of activity no;  0 times per week    Sunscreen: Yes    Seatbelts:  Yes    Self Breast Exam:  No    Self Testicular Exam: n/a    Physical/Emotional/Sexual Abuse: No    Do you feel safe in your environment? Yes        Cholesterol screen up to date: No    Eye Exam up to date: Yes    Dental Exam up to date: Yes    Pap smear up to date: PAP   ASC-US   11/3/08    Mammogram up to date: No: needs referral    Dexa Scan up to date: Does Not Apply    Colonoscopy up to date: Does Not Apply    Immunizations up to date: Unknown    Glucose screen if over 40:  N/a        Misty Garrido CMA                                             Social Determinants of Health     Financial Resource Strain: Not on file   Food Insecurity: Not on file   Transportation Needs: Not on file   Physical Activity: Not on file   Stress: Not on file   Social Connections: Not on file   Intimate Partner Violence: Not on file   Housing Stability: Not on file         FAMILY HISTORY:  Family History   Problem Relation Age of Onset    C.A.D.  Paternal Grandfather     Hypertension Father     Lipids Father     Gastrointestinal Disease Father         Gall bladder removed (not due to stones)/polyps removed from colon age 60    Hyperlipidemia Father     Cerebrovascular Disease Father     Diabetes Paternal Grandmother     Arthritis Maternal Grandmother     Osteoporosis Maternal Grandmother     Genetic Disorder Mother         autoimmune    Hypertension Brother     Lipids Brother     Coronary Artery Disease No family hx of     Hyperlipidemia No family hx of     Cerebrovascular Disease No family hx of     Breast Cancer No family hx of     Colon Cancer No family hx of     Prostate Cancer No family hx of     Other Cancer No family hx of     Depression No family hx of     Anxiety Disorder No family hx of     Mental Illness No family hx of     Substance Abuse No family hx of     Anesthesia Reaction No family hx of     Asthma No family hx of     Genetic Disorder No family hx of     Thyroid Disease No family hx of     Obesity No family hx of     Unknown/Adopted No family hx of          PHYSICAL EXAM:  Vital signs:  There were no vitals taken for this visit.         LABS:   Latest Reference Range & Units 08/03/23 07:45   Sodium 136 - 145 mmol/L 142   Potassium 3.4 - 5.3 mmol/L 4.1   Chloride 98 - 107 mmol/L 105   Carbon Dioxide (CO2) 22 - 29 mmol/L 26   Urea Nitrogen 6.0 - 20.0 mg/dL 10.8   Creatinine 0.51 - 0.95 mg/dL 0.67   GFR Estimate >60 mL/min/1.73m2 >90   Calcium 8.6 - 10.0 mg/dL 9.9   Anion Gap 7 - 15 mmol/L 11   Albumin 3.5 - 5.2 g/dL 4.4   Protein Total 6.4 - 8.3 g/dL 7.0   Alkaline Phosphatase 35 - 104 U/L 70   ALT 0 - 50 U/L 20   AST 0 - 45 U/L 28   Bilirubin Total <=1.2 mg/dL 0.5   Copper 80.0 - 155.0 ug/dL 133.4   CRP Inflammation <5.00 mg/L <3.00   Cyclic Citrullinated Peptide Antibody, IgG <7.0 U/mL 1.8   RBC FOLATE  Rpt   Glucose 70 - 99 mg/dL 94   Lactate Dehydrogenase 0 - 250 U/L 141   Rheumatoid Factor <12 IU/mL 6   TSH 0.30 - 4.20 uIU/mL 2.32    Vitamin B12 232 - 1,245 pg/mL 473   Vitamin B6 20.0 - 125.0 nmol/L 72.8   Zinc 60.0 - 120.0 ug/dL 97.8   WBC 4.0 - 11.0 10e3/uL 3.6 (L)   Hemoglobin 11.7 - 15.7 g/dL 12.6   Hematocrit 35.0 - 47.0 % 37.3   Platelet Count 150 - 450 10e3/uL 230   RBC Count 3.80 - 5.20 10e6/uL 4.04   MCV 78 - 100 fL 92   MCH 26.5 - 33.0 pg 31.2   MCHC 31.5 - 36.5 g/dL 33.8   RDW 10.0 - 15.0 % 12.0   % Neutrophils % 41   % Lymphocytes % 35   % Monocytes % 14   % Eosinophils % 8   % Basophils % 1   Absolute Basophils 0.0 - 0.2 10e3/uL 0.0   Absolute Eosinophils 0.0 - 0.7 10e3/uL 0.3   Absolute Immature Granulocytes <=0.4 10e3/uL 0.0   Absolute Lymphocytes 0.8 - 5.3 10e3/uL 1.3   Absolute Monocytes 0.0 - 1.3 10e3/uL 0.5   % Immature Granulocytes % 1   Absolute Neutrophils 1.6 - 8.3 10e3/uL 1.5 (L)   Absolute NRBCs 10e3/uL 0.0   NRBCs per 100 WBC <1 /100 0   % Retic 0.5 - 2.0 % 1.3   Absolute Retic 0.025 - 0.095 10e6/uL 0.052   Sed Rate 0 - 30 mm/hr 10   Hep B Surface Agn Nonreactive  Nonreactive   Hepatitis B Core Luisito Nonreactive  Nonreactive   Hepatitis B Surface Antibody Instrument Value <8.00 m[IU]/mL 1.22   Hepatitis B Surface Antibody  Nonreactive   HIV Antigen Antibody Combo Nonreactive  Nonreactive   ANTI NUCLEAR LUISITO IGG BY IFA WITH REFLEX  Rpt !   BLOOD MORPHOLOGY PATHOLOGIST REVIEW  Rpt   BELEN interpretation Negative  Positive !   BELEN pattern 1  Speckled   BELEN pattern 2  Nuclear dots   BELEN titer 1  1:320   BELEN titer 2  1:320   LAB BLOOD MORPHOLOGY PATHOLOGIST REVIEW  Rpt !   (L): Data is abnormally low  !: Data is abnormal  Rpt: View report in Results Review for more information    Case Report   Flow Cytometry Report                             Case: [id number]                                   Authorizing Provider:  Lelo Harper DO         Collected:           08/10/2023 09:19 AM           Ordering Location:     [facility]   Received:            08/10/2023 09:19 AM                                  Center  [place]                                                             Pathologist:           Candice Fisher MD                                                         Specimen:    Peripheral Blood                                                                          Flow Interpretation   A. Peripheral Blood:  -- Polytypic B cells  - No aberrant immunophenotype on T cells  - Rare-to-absent blasts      Electronically signed by Candice Fisher MD on 8/11/2023 at 12:26 PM   Comment    There is no immunophenotypic evidence of non-Hodgkin lymphoma, lymphoid leukemia, acute leukemia or another high-grade myeloid neoplasm. T cell lymphomas cannot always be detected by this flow cytometry assay. Final interpretation requires correlation with results of other ancillary studies, morphologic, and clinical features.    Flow Phenotypic Data    Unless otherwise indicated, percentages reported below are based on the total number of CD45 positive viable leukocytes. If applicable, percentage of plasma cells is from total viable nucleated cells.     4.0% polytypic B cells  33% T cells with a CD4:CD8 ratio of 2.7:1.   3.0% NK cells     Myeloid  blasts are rare to absent.        Case was reviewed by the following:  Pathology Fellow: Queta Golden MD  A resident/fellow was involved in the selection of testing, review of flow scattergrams, and/or interpretation of this case.  I, as the senior physician, attest that I: (i) confirmed appropriate testing, (ii) examined the relevant flow scattergrams for the specimen(s); and (ii) rendered or confirmed the interpretation(s).   Flow Processing Information    Multi-color flow analysis is performed for the following markers: CD2, CD3, CD4, CD5, CD7, CD8, CD10, CD13, CD14, CD16, CD19, CD20, CD34, CD38, CD45, CD56, CD57, CD64, , HLA-DR, and kappa and lambda immunoglobulin light chains. Cells are gated to isolate populations (CD45 versus side scatter and forward  scatter versus side scatter), to exclude debris (forward scatter versus side scatter) and to exclude cell doublets (forward scatter height versus forward scatter width and side scatter height versus side scatter width). Forward scatter varies with cell size. Side scatter varies with the amount of cytoplasmic granules. Intensity for CD45 usually increases as hematolymphoid cells mature.       Clinical Information    52 year-old woman with a history of arthritis, presenting for neutropenia.   FDA Disclaimer    This test was developed and its performance characteristics determined by the [facility]. It has not been cleared or approved by the US Food and Drug Administration.  FDA does not require this test to go through premarket FDA review. This test is used for clinical purposes and should not be regarded as investigational or for research. This laboratory is certified under the Clinical Laboratory Improvement Amendments (CLIA) as qualified to perform high complexity clinical laboratory testing.   Performing Labs    The technical component of this testing was completed at [facility]   Resulting Agency [facility]              Specimen Collected: 08/10/23  9:19 AM Last Resulted: 08/11/23 12:26 PM                08/10/23 09:19   SPECIMEN EXPIRATION DATE [id number]   Fya Antigen Type Positive   Fyb Antigen Type Negative   FLOW CYTOMETRY Rpt   Rpt: View report in Results Review for more information    PATHOLOGY:        IMAGING:  Narrative & Impression   EXAMINATION: Limited Abdominal Ultrasound, 8/10/2023 6:48 AM      COMPARISON: None.     HISTORY: Neutropenia, unspecified type; rule out splenomegaly     Technique: Limited grayscale, color and spectral Doppler ultrasound of  the spleen was performed.     FINDINGS: Normal grayscale appearance of the spleen. No focal splenic  lesion. Patent splenic artery  with normal waveforms and the hilum,  patent splenic vein with flow at the hilum. The spleen measures 11.7 x  4.7 x 11.6 cm                                                                      IMPRESSION: No splenomegaly.     ALEXY METZ MD           ASSESSMENT/PLAN:  Yessica Gar is a 52 year old female with:    #Leukopenia with mild neutropenia  - Total WBC 3.6 since 6/23, no differentials available  -11/22 hepatitis C negative, TSH 2.81  -8/23 labs  CBC: WBC 3.6, ANC 1.5, hemoglobin 12.6, platelet 230 K  ESR normal, CRP normal, BELEN positive 1:320 speckled with nuclear dots, rheumatoid factor normal, anti-CCP antibody negative  B12 473, B6 normal, RBC folate 551, copper normal, zinc normal  TSH 2.32  CMP: Total bilirubin normal, no protein gap,   peripheral smear: Slight leukopenia with neutropenia, lymphocytes polymorphous with no significant proportion of granular lymphocytes, neutrophils with unremarkable cytoplasmic granularity and nuclear morphology.  No definitive dysplasia among neutrophils  Hepatitis B negative, HIV negative  Peripheral blood flow cytometry: Polytypic B cells, no a variant immunophenotype on T cells, rare to absent blasts  RBC antigens: Fya antigen positive, Fyb antigen negative  - 8/23 abdominal ultrasound: Spleen 11.7 cm, no splenomegaly    PLAN:  - I believe the patient's mild neutropenia is related to rheumatologic etiology  - BELEN positive 1:320, speckled w/nuclear dots, rheumatology referral placed 8/2/2023 and pt plans to see rheumatology within [facility]in network  - Given neutropenia with positive BELEN, I checked an ultrasound to rule out splenomegaly and Felty syndrome, ultrasound did not show splenomegaly  -No other etiology found for patient's mild neutropenia, she does not have nutrient deficiencies, thyroid abnormalities, morphologically abnormal lymphocytes or neutrophils, chronic infections with hepatitis or HIV, Charlton null associated  neutropenia/benign ethnic neutropenia (which requires absence of both Fya and Fyb RBC antigens)  - No sinister etiology for patient's neutropenia found, peripheral blood flow cytometry is normal  - Can continue to monitor CBC with differential with PCP every 6 months and if significant cytopenias or recurrent infections or concern can return to hematology for further assessment  - pt offered follow up with me or PCP and she wishes to follow up with PCP which is fine    #arthritis  - multiple joints, progressive  - BELEN positive 1:320, speckled w/nuclear dots, rheumatology referral placed 8/2/2023    RTC as needed  Continue follow-up with PCP every 6 months for labs as detailed above        Lelo DO Leroy  Hematology/Oncology  [facility]

## 2023-08-23 NOTE — LETTER
8/23/2023         RE: Yessica Gar  [address] [facility] [address]        Dear Colleague,    Thank you for referring your patient, Yessica Gar, to the [facility]. Please see a copy of my visit note below.    Video-Visit Details     Video Start Time: 4:23PM      Type of service:  Video Visit     Video End Time: 4:39PM    Originating Location (pt. Location): Home     Distant Location (provider location): Off-site     Platform used for Video Visit: [facility]    Hematology/Oncology Established Patient Follow-Up Note      Today's Date: 8/23/2023     Reason for follow-up: Leukopenia    HISTORY OF PRESENT ILLNESS: Yessica Gar is a 52 year old female who presents for follow-up    Patient has medical history including arthritis, hyperlipidemia, uterine vaginal prolapse s/p supracervical hysterectomy (no oopherectomy) 6/23, ASCUS, TMJ, anxiety      Regarding leukopenia:    There are no differentials available for WBC count  I have also checked care everywhere, no recent WBC counts or differentials  In 11/2004, patient had labs at [facility] showing WBC count 4.9, neutrophil percent 48.9, though ANC 2.4    Pt reports arthritis that is progressively worsening- initially in knees, now in elbows. She does workouts including HIT w/weight lifting, felt like tennis elbow, associated with joint swelling. No small joint pain or swelling or stiffness.     Pt reports a lot of autoimmune disorders in family- reports her mother had an autoimmune disorders, leukopenia, some disorder related to nails, inflammation of skin. Pts aunts also have some autoimmune d/o    Pt is not prone to infections, no frequent infections, does not require hospitalizations or antibiotics. 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 No oral sores, gingivitis.     Pt denies weight loss, night sweats,  CAN you please get pre auth for monoisc RT knee thank you    palpable lymphadenopathy, early satiety    Pt is a vegetarian for most of her life, does have dairy and eggs. She did recently start taking plant protein. She is not taking any particular vitamins except vitamin D.     Pt denies recent travel in the past year, went to [place] in 4/2022.     Pt denies drug use including marijuana. Pt reports 1-2 glasses of wine per day since 7/23, no binge drinking episodes.     Pt denies new medications, no herbal supplements. Pt had hepatitis and shingles #2 vaccine in early 2023.     Though pts mother has some reported changes in nails, no confirmed family history for the following:  Fanconi anemia- Short stature, café au lait lesions, hypoplastic, microcephaly, hypergonadism  Telomere biology disorders-nail and hair changes, leukoplakia, rash, skin hypertrophy on hands and feet, esophageal strictures, liver and pulmonary fibrosis  Giulia-Blackfan anemia- macrocytic anemia, hypoproliferative with decreased reticulocytosis, microcephaly, sparse eyebrows, almond shaped eyes, micrognathia, hypothenar eminence, digitize thumbs  Severe congenital neutropenia-delayed cord separation, infection of umbilical cord stump  Shwachman giulia syndrome-pancreatic insufficiency, steatorrhea, increased LFTs, skeletal deformities, cardiac disorders, eczema, cognitive dysfunction, increased infections    INTERIM HISTORY:  Pt has been doing well since last visit  Pt is healing from surgery, s/p laparoscopic supracervical hysterectomy with bilateral salpingectomy, laparoscopic sacrocolpopexy, laparoscopic enterocele repair, midurethral sling via retropubic approach   Pt is back at work as a teacher  She denies recent fever, infection sx    REVIEW OF SYSTEMS:   A 14 point ROS was reviewed with pertinent positives and negatives in the HPI.        HOME MEDICATIONS:  Current Outpatient Medications   Medication Sig Dispense Refill     escitalopram (LEXAPRO) 20 MG tablet Take 1 tablet (20 mg) by mouth  daily 90 tablet 1         ALLERGIES:  Allergies   Allergen Reactions     Cats          PAST MEDICAL HISTORY:  Past Medical History:   Diagnosis Date     Abnormal Pap smear of cervix 04/09/2021 04/09/21     Arthritis 1988     ASCUS favor benign 05, 07, 08    all neg HPV         PAST SURGICAL HISTORY:  Past Surgical History:   Procedure Laterality Date     NO HISTORY OF SURGERY           SOCIAL HISTORY:  Social History     Socioeconomic History     Marital status:      Spouse name: Luis F     Number of children: 1     Years of education: Not on file     Highest education level: Not on file   Occupational History     Employer: [employer]   Tobacco Use     Smoking status: Never     Smokeless tobacco: Never   Vaping Use     Vaping Use: Never used   Substance and Sexual Activity     Alcohol use: Yes     Comment: a couple times a month     Drug use: Never     Sexual activity: Yes     Partners: Male     Birth control/protection: Abstinence   Other Topics Concern     Parent/sibling w/ CABG, MI or angioplasty before 65F 55M? No   Social History Narrative    Social Documentation: 12/22/2009        Balanced Diet: YES    Calcium intake: 2-3 per day    Caffeine: 0-1 per day    Exercise:  type of activity no;  0 times per week    Sunscreen: Yes    Seatbelts:  Yes    Self Breast Exam:  No    Self Testicular Exam: n/a    Physical/Emotional/Sexual Abuse: No    Do you feel safe in your environment? Yes        Cholesterol screen up to date: No    Eye Exam up to date: Yes    Dental Exam up to date: Yes    Pap smear up to date: PAP   ASC-US   11/3/08    Mammogram up to date: No: needs referral    Dexa Scan up to date: Does Not Apply    Colonoscopy up to date: Does Not Apply    Immunizations up to date: Unknown    Glucose screen if over 40:  N/a        Misty Garrido CMA                                             Social Determinants of Health     Financial Resource Strain: Not on file   Food Insecurity: Not on  file   Transportation Needs: Not on file   Physical Activity: Not on file   Stress: Not on file   Social Connections: Not on file   Intimate Partner Violence: Not on file   Housing Stability: Not on file         FAMILY HISTORY:  Family History   Problem Relation Age of Onset     C.A.D. Paternal Grandfather      Hypertension Father      Lipids Father      Gastrointestinal Disease Father         Gall bladder removed (not due to stones)/polyps removed from colon age 60     Hyperlipidemia Father      Cerebrovascular Disease Father      Diabetes Paternal Grandmother      Arthritis Maternal Grandmother      Osteoporosis Maternal Grandmother      Genetic Disorder Mother         autoimmune     Hypertension Brother      Lipids Brother      Coronary Artery Disease No family hx of      Hyperlipidemia No family hx of      Cerebrovascular Disease No family hx of      Breast Cancer No family hx of      Colon Cancer No family hx of      Prostate Cancer No family hx of      Other Cancer No family hx of      Depression No family hx of      Anxiety Disorder No family hx of      Mental Illness No family hx of      Substance Abuse No family hx of      Anesthesia Reaction No family hx of      Asthma No family hx of      Genetic Disorder No family hx of      Thyroid Disease No family hx of      Obesity No family hx of      Unknown/Adopted No family hx of          PHYSICAL EXAM:  Vital signs:  There were no vitals taken for this visit.         LABS:   Latest Reference Range & Units 08/03/23 07:45   Sodium 136 - 145 mmol/L 142   Potassium 3.4 - 5.3 mmol/L 4.1   Chloride 98 - 107 mmol/L 105   Carbon Dioxide (CO2) 22 - 29 mmol/L 26   Urea Nitrogen 6.0 - 20.0 mg/dL 10.8   Creatinine 0.51 - 0.95 mg/dL 0.67   GFR Estimate >60 mL/min/1.73m2 >90   Calcium 8.6 - 10.0 mg/dL 9.9   Anion Gap 7 - 15 mmol/L 11   Albumin 3.5 - 5.2 g/dL 4.4   Protein Total 6.4 - 8.3 g/dL 7.0   Alkaline Phosphatase 35 - 104 U/L 70   ALT 0 - 50 U/L 20   AST 0 - 45 U/L 28    Bilirubin Total <=1.2 mg/dL 0.5   Copper 80.0 - 155.0 ug/dL 133.4   CRP Inflammation <5.00 mg/L <3.00   Cyclic Citrullinated Peptide Antibody, IgG <7.0 U/mL 1.8   RBC FOLATE  Rpt   Glucose 70 - 99 mg/dL 94   Lactate Dehydrogenase 0 - 250 U/L 141   Rheumatoid Factor <12 IU/mL 6   TSH 0.30 - 4.20 uIU/mL 2.32   Vitamin B12 232 - 1,245 pg/mL 473   Vitamin B6 20.0 - 125.0 nmol/L 72.8   Zinc 60.0 - 120.0 ug/dL 97.8   WBC 4.0 - 11.0 10e3/uL 3.6 (L)   Hemoglobin 11.7 - 15.7 g/dL 12.6   Hematocrit 35.0 - 47.0 % 37.3   Platelet Count 150 - 450 10e3/uL 230   RBC Count 3.80 - 5.20 10e6/uL 4.04   MCV 78 - 100 fL 92   MCH 26.5 - 33.0 pg 31.2   MCHC 31.5 - 36.5 g/dL 33.8   RDW 10.0 - 15.0 % 12.0   % Neutrophils % 41   % Lymphocytes % 35   % Monocytes % 14   % Eosinophils % 8   % Basophils % 1   Absolute Basophils 0.0 - 0.2 10e3/uL 0.0   Absolute Eosinophils 0.0 - 0.7 10e3/uL 0.3   Absolute Immature Granulocytes <=0.4 10e3/uL 0.0   Absolute Lymphocytes 0.8 - 5.3 10e3/uL 1.3   Absolute Monocytes 0.0 - 1.3 10e3/uL 0.5   % Immature Granulocytes % 1   Absolute Neutrophils 1.6 - 8.3 10e3/uL 1.5 (L)   Absolute NRBCs 10e3/uL 0.0   NRBCs per 100 WBC <1 /100 0   % Retic 0.5 - 2.0 % 1.3   Absolute Retic 0.025 - 0.095 10e6/uL 0.052   Sed Rate 0 - 30 mm/hr 10   Hep B Surface Agn Nonreactive  Nonreactive   Hepatitis B Core Luisito Nonreactive  Nonreactive   Hepatitis B Surface Antibody Instrument Value <8.00 m[IU]/mL 1.22   Hepatitis B Surface Antibody  Nonreactive   HIV Antigen Antibody Combo Nonreactive  Nonreactive   ANTI NUCLEAR LUISITO IGG BY IFA WITH REFLEX  Rpt !   BLOOD MORPHOLOGY PATHOLOGIST REVIEW  Rpt   EBLEN interpretation Negative  Positive !   BELEN pattern 1  Speckled   BELEN pattern 2  Nuclear dots   BELEN titer 1  1:320   BELEN titer 2  1:320   LAB BLOOD MORPHOLOGY PATHOLOGIST REVIEW  Rpt !   (L): Data is abnormally low  !: Data is abnormal  Rpt: View report in Results Review for more information    Case Report   Flow Cytometry Report                              Case: [id number]                                   Authorizing Provider:  Lelo Harper DO         Collected:           08/10/2023 09:19 AM           Ordering Location:     [facility]   Received:            08/10/2023 09:19 AM                                  [facility]                                                             Pathologist:           Candice Fisher MD                                                         Specimen:    Peripheral Blood                                                                          Flow Interpretation   A. Peripheral Blood:  -- Polytypic B cells  - No aberrant immunophenotype on T cells  - Rare-to-absent blasts      Electronically signed by Candice Fisher MD on 8/11/2023 at 12:26 PM   Comment    There is no immunophenotypic evidence of non-Hodgkin lymphoma, lymphoid leukemia, acute leukemia or another high-grade myeloid neoplasm. T cell lymphomas cannot always be detected by this flow cytometry assay. Final interpretation requires correlation with results of other ancillary studies, morphologic, and clinical features.    Flow Phenotypic Data    Unless otherwise indicated, percentages reported below are based on the total number of CD45 positive viable leukocytes. If applicable, percentage of plasma cells is from total viable nucleated cells.     4.0% polytypic B cells  33% T cells with a CD4:CD8 ratio of 2.7:1.   3.0% NK cells     Myeloid  blasts are rare to absent.        Case was reviewed by the following:  Pathology Fellow: Queta Golden MD  A resident/fellow was involved in the selection of testing, review of flow scattergrams, and/or interpretation of this case.  I, as the senior physician, attest that I: (i) confirmed appropriate testing, (ii) examined the relevant flow scattergrams for the specimen(s); and (ii) rendered or confirmed the interpretation(s).   Flow Processing Information    Multi-color  flow analysis is performed for the following markers: CD2, CD3, CD4, CD5, CD7, CD8, CD10, CD13, CD14, CD16, CD19, CD20, CD34, CD38, CD45, CD56, CD57, CD64, , HLA-DR, and kappa and lambda immunoglobulin light chains. Cells are gated to isolate populations (CD45 versus side scatter and forward scatter versus side scatter), to exclude debris (forward scatter versus side scatter) and to exclude cell doublets (forward scatter height versus forward scatter width and side scatter height versus side scatter width). Forward scatter varies with cell size. Side scatter varies with the amount of cytoplasmic granules. Intensity for CD45 usually increases as hematolymphoid cells mature.       Clinical Information    52 year-old woman with a history of arthritis, presenting for neutropenia.   FDA Disclaimer    This test was developed and its performance characteristics determined by the [facility]. It has not been cleared or approved by the US Food and Drug Administration.  FDA does not require this test to go through premarket FDA review. This test is used for clinical purposes and should not be regarded as investigational or for research. This laboratory is certified under the Clinical Laboratory Improvement Amendments (CLIA) as qualified to perform high complexity clinical laboratory testing.   Performing Labs    The technical component of this testing was completed at [facility]   Resulting Agency [facility]              Specimen Collected: 08/10/23  9:19 AM Last Resulted: 08/11/23 12:26 PM                08/10/23 09:19   SPECIMEN EXPIRATION DATE [id number]   Fya Antigen Type Positive   Fyb Antigen Type Negative   FLOW CYTOMETRY Rpt   Rpt: View report in Results Review for more information    PATHOLOGY:        IMAGING:  Narrative & Impression   EXAMINATION: Limited Abdominal Ultrasound, 8/10/2023 6:48  AM      COMPARISON: None.     HISTORY: Neutropenia, unspecified type; rule out splenomegaly     Technique: Limited grayscale, color and spectral Doppler ultrasound of  the spleen was performed.     FINDINGS: Normal grayscale appearance of the spleen. No focal splenic  lesion. Patent splenic artery with normal waveforms and the hilum,  patent splenic vein with flow at the hilum. The spleen measures 11.7 x  4.7 x 11.6 cm                                                                      IMPRESSION: No splenomegaly.     ALEXY METZ MD           ASSESSMENT/PLAN:  Yessica Gar is a 52 year old female with:    #Leukopenia with mild neutropenia  - Total WBC 3.6 since 6/23, no differentials available  -11/22 hepatitis C negative, TSH 2.81  -8/23 labs  CBC: WBC 3.6, ANC 1.5, hemoglobin 12.6, platelet 230 K  ESR normal, CRP normal, BELEN positive 1:320 speckled with nuclear dots, rheumatoid factor normal, anti-CCP antibody negative  B12 473, B6 normal, RBC folate 551, copper normal, zinc normal  TSH 2.32  CMP: Total bilirubin normal, no protein gap,   peripheral smear: Slight leukopenia with neutropenia, lymphocytes polymorphous with no significant proportion of granular lymphocytes, neutrophils with unremarkable cytoplasmic granularity and nuclear morphology.  No definitive dysplasia among neutrophils  Hepatitis B negative, HIV negative  Peripheral blood flow cytometry: Polytypic B cells, no a variant immunophenotype on T cells, rare to absent blasts  RBC antigens: Fya antigen positive, Fyb antigen negative  - 8/23 abdominal ultrasound: Spleen 11.7 cm, no splenomegaly    PLAN:  - I believe the patient's mild neutropenia is related to rheumatologic etiology  - BELEN positive 1:320, speckled w/nuclear dots, rheumatology referral placed 8/2/2023 and pt plans to see rheumatology within [facility] network  - Given neutropenia with positive BELEN, I checked an ultrasound to rule out splenomegaly and Felty  syndrome, ultrasound did not show splenomegaly  -No other etiology found for patient's mild neutropenia, she does not have nutrient deficiencies, thyroid abnormalities, morphologically abnormal lymphocytes or neutrophils, chronic infections with hepatitis or HIV, Charlton null associated neutropenia/benign ethnic neutropenia (which requires absence of both Fya and Fyb RBC antigens)  - No sinister etiology for patient's neutropenia found, peripheral blood flow cytometry is normal  - Can continue to monitor CBC with differential with PCP every 6 months and if significant cytopenias or recurrent infections or concern can return to hematology for further assessment  - pt offered follow up with me or PCP and she wishes to follow up with PCP which is fine    #arthritis  - multiple joints, progressive  - BELEN positive 1:320, speckled w/nuclear dots, rheumatology referral placed 8/2/2023    RTC as needed  Continue follow-up with PCP every 6 months for labs as detailed above        Neptali De Leon DO  Hematology/Oncology  [facility]           Again, thank you for allowing me to participate in the care of your patient.        Sincerely,        NEPTALI DE LEON DO

## 2023-08-23 NOTE — LETTER
8/23/2023         RE: Yessica Gar  [address] [facility] [address]        Dear Colleague,    Thank you for referring your patient, Yessica Gar, to the [facility]. Please see a copy of my visit note below.    Video-Visit Details     Video Start Time: 4:23PM      Type of service:  Video Visit     Video End Time: 4:39PM    Originating Location (pt. Location): Home     Distant Location (provider location): Off-site     Platform used for Video Visit: [facility]    Hematology/Oncology Established Patient Follow-Up Note      Today's Date: 8/23/2023     Reason for follow-up: Leukopenia    HISTORY OF PRESENT ILLNESS: Yessica Gar is a 52 year old female who presents for follow-up    Patient has medical history including arthritis, hyperlipidemia, uterine vaginal prolapse s/p supracervical hysterectomy (no oopherectomy) 6/23, ASCUS, TMJ, anxiety      Regarding leukopenia:    There are no differentials available for WBC count  I have also checked care everywhere, no recent WBC counts or differentials  In 11/2004, patient had labs at [facility] showing WBC count 4.9, neutrophil percent 48.9, though ANC 2.4    Pt reports arthritis that is progressively worsening- initially in knees, now in elbows. She does workouts including HIT w/weight lifting, felt like tennis elbow, associated with joint swelling. No small joint pain or swelling or stiffness.     Pt reports a lot of autoimmune disorders in family- reports her mother had an autoimmune disorders, leukopenia, some disorder related to nails, inflammation of skin. Pts aunts also have some autoimmune d/o    Pt is not prone to infections, no frequent infections, does not require hospitalizations or antibiotics. 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 No oral sores, gingivitis.     Pt denies weight loss, night sweats,  palpable lymphadenopathy, early satiety    Pt is a vegetarian for most of her life, does have dairy and eggs. She did recently start taking plant protein. She is not taking any particular vitamins except vitamin D.     Pt denies recent travel in the past year, went to [place] in 4/2022.     Pt denies drug use including marijuana. Pt reports 1-2 glasses of wine per day since 7/23, no binge drinking episodes.     Pt denies new medications, no herbal supplements. Pt had hepatitis and shingles #2 vaccine in early 2023.     Though pts mother has some reported changes in nails, no confirmed family history for the following:  Fanconi anemia- Short stature, café au lait lesions, hypoplastic, microcephaly, hypergonadism  Telomere biology disorders-nail and hair changes, leukoplakia, rash, skin hypertrophy on hands and feet, esophageal strictures, liver and pulmonary fibrosis  Giulia-Blackfan anemia- macrocytic anemia, hypoproliferative with decreased reticulocytosis, microcephaly, sparse eyebrows, almond shaped eyes, micrognathia, hypothenar eminence, digitize thumbs  Severe congenital neutropenia-delayed cord separation, infection of umbilical cord stump  Shwachman giulia syndrome-pancreatic insufficiency, steatorrhea, increased LFTs, skeletal deformities, cardiac disorders, eczema, cognitive dysfunction, increased infections    INTERIM HISTORY:  Pt has been doing well since last visit  Pt is healing from surgery, s/p laparoscopic supracervical hysterectomy with bilateral salpingectomy, laparoscopic sacrocolpopexy, laparoscopic enterocele repair, midurethral sling via retropubic approach   Pt is back at work as a teacher  She denies recent fever, infection sx    REVIEW OF SYSTEMS:   A 14 point ROS was reviewed with pertinent positives and negatives in the HPI.        HOME MEDICATIONS:  Current Outpatient Medications   Medication Sig Dispense Refill     escitalopram (LEXAPRO) 20 MG tablet Take 1 tablet (20 mg) by mouth  daily 90 tablet 1         ALLERGIES:  Allergies   Allergen Reactions     Cats          PAST MEDICAL HISTORY:  Past Medical History:   Diagnosis Date     Abnormal Pap smear of cervix 04/09/2021 04/09/21     Arthritis 1988     ASCUS favor benign 05, 07, 08    all neg HPV         PAST SURGICAL HISTORY:  Past Surgical History:   Procedure Laterality Date     NO HISTORY OF SURGERY           SOCIAL HISTORY:  Social History     Socioeconomic History     Marital status:      Spouse name: Luis F     Number of children: 1     Years of education: Not on file     Highest education level: Not on file   Occupational History     Employer: [employer]   Tobacco Use     Smoking status: Never     Smokeless tobacco: Never   Vaping Use     Vaping Use: Never used   Substance and Sexual Activity     Alcohol use: Yes     Comment: a couple times a month     Drug use: Never     Sexual activity: Yes     Partners: Male     Birth control/protection: Abstinence   Other Topics Concern     Parent/sibling w/ CABG, MI or angioplasty before 65F 55M? No   Social History Narrative    Social Documentation: 12/22/2009        Balanced Diet: YES    Calcium intake: 2-3 per day    Caffeine: 0-1 per day    Exercise:  type of activity no;  0 times per week    Sunscreen: Yes    Seatbelts:  Yes    Self Breast Exam:  No    Self Testicular Exam: n/a    Physical/Emotional/Sexual Abuse: No    Do you feel safe in your environment? Yes        Cholesterol screen up to date: No    Eye Exam up to date: Yes    Dental Exam up to date: Yes    Pap smear up to date: PAP   ASC-US   11/3/08    Mammogram up to date: No: needs referral    Dexa Scan up to date: Does Not Apply    Colonoscopy up to date: Does Not Apply    Immunizations up to date: Unknown    Glucose screen if over 40:  N/a        Misty Garrido CMA                                             Social Determinants of Health     Financial Resource Strain: Not on file   Food Insecurity: Not on  file   Transportation Needs: Not on file   Physical Activity: Not on file   Stress: Not on file   Social Connections: Not on file   Intimate Partner Violence: Not on file   Housing Stability: Not on file         FAMILY HISTORY:  Family History   Problem Relation Age of Onset     C.A.D. Paternal Grandfather      Hypertension Father      Lipids Father      Gastrointestinal Disease Father         Gall bladder removed (not due to stones)/polyps removed from colon age 60     Hyperlipidemia Father      Cerebrovascular Disease Father      Diabetes Paternal Grandmother      Arthritis Maternal Grandmother      Osteoporosis Maternal Grandmother      Genetic Disorder Mother         autoimmune     Hypertension Brother      Lipids Brother      Coronary Artery Disease No family hx of      Hyperlipidemia No family hx of      Cerebrovascular Disease No family hx of      Breast Cancer No family hx of      Colon Cancer No family hx of      Prostate Cancer No family hx of      Other Cancer No family hx of      Depression No family hx of      Anxiety Disorder No family hx of      Mental Illness No family hx of      Substance Abuse No family hx of      Anesthesia Reaction No family hx of      Asthma No family hx of      Genetic Disorder No family hx of      Thyroid Disease No family hx of      Obesity No family hx of      Unknown/Adopted No family hx of          PHYSICAL EXAM:  Vital signs:  There were no vitals taken for this visit.         LABS:   Latest Reference Range & Units 08/03/23 07:45   Sodium 136 - 145 mmol/L 142   Potassium 3.4 - 5.3 mmol/L 4.1   Chloride 98 - 107 mmol/L 105   Carbon Dioxide (CO2) 22 - 29 mmol/L 26   Urea Nitrogen 6.0 - 20.0 mg/dL 10.8   Creatinine 0.51 - 0.95 mg/dL 0.67   GFR Estimate >60 mL/min/1.73m2 >90   Calcium 8.6 - 10.0 mg/dL 9.9   Anion Gap 7 - 15 mmol/L 11   Albumin 3.5 - 5.2 g/dL 4.4   Protein Total 6.4 - 8.3 g/dL 7.0   Alkaline Phosphatase 35 - 104 U/L 70   ALT 0 - 50 U/L 20   AST 0 - 45 U/L 28    Bilirubin Total <=1.2 mg/dL 0.5   Copper 80.0 - 155.0 ug/dL 133.4   CRP Inflammation <5.00 mg/L <3.00   Cyclic Citrullinated Peptide Antibody, IgG <7.0 U/mL 1.8   RBC FOLATE  Rpt   Glucose 70 - 99 mg/dL 94   Lactate Dehydrogenase 0 - 250 U/L 141   Rheumatoid Factor <12 IU/mL 6   TSH 0.30 - 4.20 uIU/mL 2.32   Vitamin B12 232 - 1,245 pg/mL 473   Vitamin B6 20.0 - 125.0 nmol/L 72.8   Zinc 60.0 - 120.0 ug/dL 97.8   WBC 4.0 - 11.0 10e3/uL 3.6 (L)   Hemoglobin 11.7 - 15.7 g/dL 12.6   Hematocrit 35.0 - 47.0 % 37.3   Platelet Count 150 - 450 10e3/uL 230   RBC Count 3.80 - 5.20 10e6/uL 4.04   MCV 78 - 100 fL 92   MCH 26.5 - 33.0 pg 31.2   MCHC 31.5 - 36.5 g/dL 33.8   RDW 10.0 - 15.0 % 12.0   % Neutrophils % 41   % Lymphocytes % 35   % Monocytes % 14   % Eosinophils % 8   % Basophils % 1   Absolute Basophils 0.0 - 0.2 10e3/uL 0.0   Absolute Eosinophils 0.0 - 0.7 10e3/uL 0.3   Absolute Immature Granulocytes <=0.4 10e3/uL 0.0   Absolute Lymphocytes 0.8 - 5.3 10e3/uL 1.3   Absolute Monocytes 0.0 - 1.3 10e3/uL 0.5   % Immature Granulocytes % 1   Absolute Neutrophils 1.6 - 8.3 10e3/uL 1.5 (L)   Absolute NRBCs 10e3/uL 0.0   NRBCs per 100 WBC <1 /100 0   % Retic 0.5 - 2.0 % 1.3   Absolute Retic 0.025 - 0.095 10e6/uL 0.052   Sed Rate 0 - 30 mm/hr 10   Hep B Surface Agn Nonreactive  Nonreactive   Hepatitis B Core Luisito Nonreactive  Nonreactive   Hepatitis B Surface Antibody Instrument Value <8.00 m[IU]/mL 1.22   Hepatitis B Surface Antibody  Nonreactive   HIV Antigen Antibody Combo Nonreactive  Nonreactive   ANTI NUCLEAR LUISITO IGG BY IFA WITH REFLEX  Rpt !   BLOOD MORPHOLOGY PATHOLOGIST REVIEW  Rpt   BELEN interpretation Negative  Positive !   BELEN pattern 1  Speckled   BELEN pattern 2  Nuclear dots   BELEN titer 1  1:320   BELEN titer 2  1:320   LAB BLOOD MORPHOLOGY PATHOLOGIST REVIEW  Rpt !   (L): Data is abnormally low  !: Data is abnormal  Rpt: View report in Results Review for more information    Case Report   Flow Cytometry Report                              Case: [id number]                                   Authorizing Provider:  Lelo Harper DO         Collected:           08/10/2023 09:19 AM           Ordering Location:     [facility]   Received:            08/10/2023 09:19 AM                                  [facility]                                                             Pathologist:           Candice Fisher MD                                                         Specimen:    Peripheral Blood                                                                          Flow Interpretation   A. Peripheral Blood:  -- Polytypic B cells  - No aberrant immunophenotype on T cells  - Rare-to-absent blasts      Electronically signed by Candice Fisher MD on 8/11/2023 at 12:26 PM   Comment    There is no immunophenotypic evidence of non-Hodgkin lymphoma, lymphoid leukemia, acute leukemia or another high-grade myeloid neoplasm. T cell lymphomas cannot always be detected by this flow cytometry assay. Final interpretation requires correlation with results of other ancillary studies, morphologic, and clinical features.    Flow Phenotypic Data    Unless otherwise indicated, percentages reported below are based on the total number of CD45 positive viable leukocytes. If applicable, percentage of plasma cells is from total viable nucleated cells.     4.0% polytypic B cells  33% T cells with a CD4:CD8 ratio of 2.7:1.   3.0% NK cells     Myeloid  blasts are rare to absent.        Case was reviewed by the following:  Pathology Fellow: Queta Golden MD  A resident/fellow was involved in the selection of testing, review of flow scattergrams, and/or interpretation of this case.  I, as the senior physician, attest that I: (i) confirmed appropriate testing, (ii) examined the relevant flow scattergrams for the specimen(s); and (ii) rendered or confirmed the interpretation(s).   Flow Processing Information    Multi-color  flow analysis is performed for the following markers: CD2, CD3, CD4, CD5, CD7, CD8, CD10, CD13, CD14, CD16, CD19, CD20, CD34, CD38, CD45, CD56, CD57, CD64, , HLA-DR, and kappa and lambda immunoglobulin light chains. Cells are gated to isolate populations (CD45 versus side scatter and forward scatter versus side scatter), to exclude debris (forward scatter versus side scatter) and to exclude cell doublets (forward scatter height versus forward scatter width and side scatter height versus side scatter width). Forward scatter varies with cell size. Side scatter varies with the amount of cytoplasmic granules. Intensity for CD45 usually increases as hematolymphoid cells mature.       Clinical Information    52 year-old woman with a history of arthritis, presenting for neutropenia.   FDA Disclaimer    This test was developed and its performance characteristics determined by the [facility]. It has not been cleared or approved by the US Food and Drug Administration.  FDA does not require this test to go through premarket FDA review. This test is used for clinical purposes and should not be regarded as investigational or for research. This laboratory is certified under the Clinical Laboratory Improvement Amendments (CLIA) as qualified to perform high complexity clinical laboratory testing.   Performing Labs    The technical component of this testing was completed at [facility]   Resulting Agency [facility]              Specimen Collected: 08/10/23  9:19 AM Last Resulted: 08/11/23 12:26 PM                08/10/23 09:19   SPECIMEN EXPIRATION DATE [id number]   Fya Antigen Type Positive   Fyb Antigen Type Negative   FLOW CYTOMETRY Rpt   Rpt: View report in Results Review for more information    PATHOLOGY:        IMAGING:  Narrative & Impression   EXAMINATION: Limited Abdominal Ultrasound, 8/10/2023 6:48  AM      COMPARISON: None.     HISTORY: Neutropenia, unspecified type; rule out splenomegaly     Technique: Limited grayscale, color and spectral Doppler ultrasound of  the spleen was performed.     FINDINGS: Normal grayscale appearance of the spleen. No focal splenic  lesion. Patent splenic artery with normal waveforms and the hilum,  patent splenic vein with flow at the hilum. The spleen measures 11.7 x  4.7 x 11.6 cm                                                                      IMPRESSION: No splenomegaly.     ALEXY METZ MD           ASSESSMENT/PLAN:  Yessica Gar is a 52 year old female with:    #Leukopenia with mild neutropenia  - Total WBC 3.6 since 6/23, no differentials available  -11/22 hepatitis C negative, TSH 2.81  -8/23 labs  CBC: WBC 3.6, ANC 1.5, hemoglobin 12.6, platelet 230 K  ESR normal, CRP normal, BELEN positive 1:320 speckled with nuclear dots, rheumatoid factor normal, anti-CCP antibody negative  B12 473, B6 normal, RBC folate 551, copper normal, zinc normal  TSH 2.32  CMP: Total bilirubin normal, no protein gap,   peripheral smear: Slight leukopenia with neutropenia, lymphocytes polymorphous with no significant proportion of granular lymphocytes, neutrophils with unremarkable cytoplasmic granularity and nuclear morphology.  No definitive dysplasia among neutrophils  Hepatitis B negative, HIV negative  Peripheral blood flow cytometry: Polytypic B cells, no a variant immunophenotype on T cells, rare to absent blasts  RBC antigens: Fya antigen positive, Fyb antigen negative  - 8/23 abdominal ultrasound: Spleen 11.7 cm, no splenomegaly    PLAN:  - I believe the patient's mild neutropenia is related to rheumatologic etiology  - BELEN positive 1:320, speckled w/nuclear dots, rheumatology referral placed 8/2/2023 and pt plans to see rheumatology within [facility] network  - Given neutropenia with positive BELEN, I checked an ultrasound to rule out splenomegaly and Felty  syndrome, ultrasound did not show splenomegaly  -No other etiology found for patient's mild neutropenia, she does not have nutrient deficiencies, thyroid abnormalities, morphologically abnormal lymphocytes or neutrophils, chronic infections with hepatitis or HIV, Charlton null associated neutropenia/benign ethnic neutropenia (which requires absence of both Fya and Fyb RBC antigens)  - No sinister etiology for patient's neutropenia found, peripheral blood flow cytometry is normal  - Can continue to monitor CBC with differential with PCP every 6 months and if significant cytopenias or recurrent infections or concern can return to hematology for further assessment  - pt offered follow up with me or PCP and she wishes to follow up with PCP which is fine    #arthritis  - multiple joints, progressive  - BELEN positive 1:320, speckled w/nuclear dots, rheumatology referral placed 8/2/2023    RTC as needed  Continue follow-up with PCP every 6 months for labs as detailed above        Neptali De Leon DO  Hematology/Oncology  [facility]           Again, thank you for allowing me to participate in the care of your patient.        Sincerely,        NEPTALI DE LEON DO

## 2023-09-02 ENCOUNTER — MYC MEDICAL ADVICE (OUTPATIENT)
Dept: FAMILY MEDICINE | Facility: CLINIC | Age: 53
End: 2023-09-02
Payer: COMMERCIAL

## 2023-09-02 DIAGNOSIS — F41.9 ANXIETY: ICD-10-CM

## 2023-09-06 RX ORDER — ESCITALOPRAM OXALATE 20 MG/1
20 TABLET ORAL DAILY
Qty: 30 TABLET | Refills: 0 | Status: SHIPPED | OUTPATIENT
Start: 2023-09-06 | End: 2023-10-01

## 2023-09-06 NOTE — TELEPHONE ENCOUNTER
Prescription approved per [facility] Refill Protocol.  Patient states she will schedule follow-up visit.  Faith KELLY RN

## 2023-10-01 ENCOUNTER — MYC REFILL (OUTPATIENT)
Dept: FAMILY MEDICINE | Facility: CLINIC | Age: 53
End: 2023-10-01
Payer: COMMERCIAL

## 2023-10-01 DIAGNOSIS — F41.9 ANXIETY: ICD-10-CM

## 2023-10-02 RX ORDER — ESCITALOPRAM OXALATE 20 MG/1
20 TABLET ORAL DAILY
Qty: 30 TABLET | Refills: 0 | Status: SHIPPED | OUTPATIENT
Start: 2023-10-02 | End: 2023-10-11

## 2023-10-07 ASSESSMENT — ENCOUNTER SYMPTOMS
MYALGIAS: 1
SORE THROAT: 0
HEARTBURN: 0
NERVOUS/ANXIOUS: 0
PARESTHESIAS: 1
BREAST MASS: 0
FREQUENCY: 0
CHILLS: 0
EYE PAIN: 0
HEMATOCHEZIA: 0
SHORTNESS OF BREATH: 0
DYSURIA: 0
WEAKNESS: 0
ARTHRALGIAS: 1
PALPITATIONS: 0
HEADACHES: 0
COUGH: 0
HEMATURIA: 0
DIARRHEA: 0
NAUSEA: 0
CONSTIPATION: 0
JOINT SWELLING: 1
DIZZINESS: 1
FEVER: 0
ABDOMINAL PAIN: 0

## 2023-10-11 ENCOUNTER — OFFICE VISIT (OUTPATIENT)
Dept: FAMILY MEDICINE | Facility: CLINIC | Age: 53
End: 2023-10-11
Payer: COMMERCIAL

## 2023-10-11 VITALS
TEMPERATURE: 97.3 F | DIASTOLIC BLOOD PRESSURE: 80 MMHG | WEIGHT: 130.6 LBS | OXYGEN SATURATION: 98 % | RESPIRATION RATE: 18 BRPM | HEIGHT: 66 IN | HEART RATE: 63 BPM | BODY MASS INDEX: 20.99 KG/M2 | SYSTOLIC BLOOD PRESSURE: 121 MMHG

## 2023-10-11 DIAGNOSIS — Z00.00 ROUTINE GENERAL MEDICAL EXAMINATION AT A HEALTH CARE FACILITY: Primary | ICD-10-CM

## 2023-10-11 DIAGNOSIS — F41.9 ANXIETY: ICD-10-CM

## 2023-10-11 DIAGNOSIS — Z83.3 FAMILY HISTORY OF DIABETES MELLITUS: ICD-10-CM

## 2023-10-11 LAB
CHOLEST SERPL-MCNC: 222 MG/DL
HBA1C MFR BLD: 5.6 % (ref 0–5.6)
HDLC SERPL-MCNC: 68 MG/DL
LDLC SERPL CALC-MCNC: 129 MG/DL
NONHDLC SERPL-MCNC: 154 MG/DL
TRIGL SERPL-MCNC: 126 MG/DL
VIT D+METAB SERPL-MCNC: 48 NG/ML (ref 20–50)

## 2023-10-11 PROCEDURE — 99213 OFFICE O/P EST LOW 20 MIN: CPT | Mod: 25 | Performed by: FAMILY MEDICINE

## 2023-10-11 PROCEDURE — 83036 HEMOGLOBIN GLYCOSYLATED A1C: CPT | Performed by: FAMILY MEDICINE

## 2023-10-11 PROCEDURE — 80061 LIPID PANEL: CPT | Performed by: FAMILY MEDICINE

## 2023-10-11 PROCEDURE — 36415 COLL VENOUS BLD VENIPUNCTURE: CPT | Performed by: FAMILY MEDICINE

## 2023-10-11 PROCEDURE — 82306 VITAMIN D 25 HYDROXY: CPT | Performed by: FAMILY MEDICINE

## 2023-10-11 PROCEDURE — 99396 PREV VISIT EST AGE 40-64: CPT | Performed by: FAMILY MEDICINE

## 2023-10-11 RX ORDER — ESCITALOPRAM OXALATE 20 MG/1
20 TABLET ORAL DAILY
Qty: 90 TABLET | Refills: 1 | Status: SHIPPED | OUTPATIENT
Start: 2023-10-11 | End: 2024-05-01

## 2023-10-11 ASSESSMENT — ENCOUNTER SYMPTOMS
NAUSEA: 0
ARTHRALGIAS: 1
HEADACHES: 0
HEMATURIA: 0
PARESTHESIAS: 1
NERVOUS/ANXIOUS: 0
DIZZINESS: 1
FEVER: 0
FREQUENCY: 0
PALPITATIONS: 0
SORE THROAT: 0
DYSURIA: 0
ABDOMINAL PAIN: 0
CONSTIPATION: 0
COUGH: 0
DIARRHEA: 0
BREAST MASS: 0
SHORTNESS OF BREATH: 0
EYE PAIN: 0
MYALGIAS: 1
CHILLS: 0
WEAKNESS: 0
HEMATOCHEZIA: 0
JOINT SWELLING: 1
HEARTBURN: 0

## 2023-10-11 ASSESSMENT — ANXIETY QUESTIONNAIRES
2. NOT BEING ABLE TO STOP OR CONTROL WORRYING: NOT AT ALL
4. TROUBLE RELAXING: NOT AT ALL
GAD7 TOTAL SCORE: 0
6. BECOMING EASILY ANNOYED OR IRRITABLE: NOT AT ALL
IF YOU CHECKED OFF ANY PROBLEMS ON THIS QUESTIONNAIRE, HOW DIFFICULT HAVE THESE PROBLEMS MADE IT FOR YOU TO DO YOUR WORK, TAKE CARE OF THINGS AT HOME, OR GET ALONG WITH OTHER PEOPLE: NOT DIFFICULT AT ALL
3. WORRYING TOO MUCH ABOUT DIFFERENT THINGS: NOT AT ALL
5. BEING SO RESTLESS THAT IT IS HARD TO SIT STILL: NOT AT ALL
7. FEELING AFRAID AS IF SOMETHING AWFUL MIGHT HAPPEN: NOT AT ALL
GAD7 TOTAL SCORE: 0
1. FEELING NERVOUS, ANXIOUS, OR ON EDGE: NOT AT ALL

## 2023-10-11 ASSESSMENT — PAIN SCALES - GENERAL: PAINLEVEL: NO PAIN (0)

## 2023-10-11 ASSESSMENT — PATIENT HEALTH QUESTIONNAIRE - PHQ9
SUM OF ALL RESPONSES TO PHQ QUESTIONS 1-9: 0
SUM OF ALL RESPONSES TO PHQ QUESTIONS 1-9: 0
10. IF YOU CHECKED OFF ANY PROBLEMS, HOW DIFFICULT HAVE THESE PROBLEMS MADE IT FOR YOU TO DO YOUR WORK, TAKE CARE OF THINGS AT HOME, OR GET ALONG WITH OTHER PEOPLE: NOT DIFFICULT AT ALL

## 2023-10-11 NOTE — PROGRESS NOTES
SUBJECTIVE:   CC: Yessica is an 52 year old who presents for preventive health visit.       10/11/2023     7:38 AM   Additional Questions   Roomed by Anahy WARE       Healthy Habits:     Getting at least 3 servings of Calcium per day:  Yes    Bi-annual eye exam:  Yes    Dental care twice a year:  Yes    Sleep apnea or symptoms of sleep apnea:  Excessive snoring    Diet:  Vegetarian/vegan    Frequency of exercise:  2-3 days/week    Duration of exercise:  30-45 minutes    Taking medications regularly:  Yes    Medication side effects:  None    Additional concerns today:  Yes      Today's PHQ-9 Score:       10/11/2023     7:21 AM   PHQ-9 SCORE   PHQ-9 Total Score MyChart 0   PHQ-9 Total Score 0       Anxiety , it is better on the lexapro 20 mg now and no side effects   Uterovaginal prolapse and DUB - she had laparoscopic Hysterectomy ( supracervical ) and tubes removed but ovaries still in   After surgery went back to work and had lifted some heavy boxed which made the prolapse worsen , she is doing PT for the pelvic floor muscles to strengthen   [facility]ing an G[facility]   Scheduled to see rheumatologist in December for possible autoimmune condition work up   Saw hematologist for the leukopenia and all the work up came back normal  , that is why referred to rheumatology     Social History     Tobacco Use    Smoking status: Never    Smokeless tobacco: Never   Substance Use Topics    Alcohol use: Yes     Comment: a couple times a month             10/7/2023     8:56 AM   Alcohol Use   Prescreen: >3 drinks/day or >7 drinks/week? No          No data to display              Reviewed orders with patient.  Reviewed health maintenance and updated orders accordingly - Yes  Lab work is in process  Labs reviewed in EPIC  BP Readings from Last 3 Encounters:   10/11/23 121/80   06/13/23 125/82   11/22/22 109/70    Wt Readings from Last 3 Encounters:   10/11/23 59.2 kg (130 lb 9.6 oz)   08/23/23 55.3 kg (122 lb)   08/02/23  56.2 kg (124 lb)                  Patient Active Problem List   Diagnosis    ASCUS of cervix with negative high risk HPV    CARDIOVASCULAR SCREENING; LDL GOAL LESS THAN 160    SAB (spontaneous )    Anxiety    Menopausal syndrome (hot flashes)    Family history of diabetes mellitus    Uterovaginal prolapse     Past Surgical History:   Procedure Laterality Date    GENITOURINARY SURGERY      GYN SURGERY      NO HISTORY OF SURGERY         Social History     Tobacco Use    Smoking status: Never    Smokeless tobacco: Never   Substance Use Topics    Alcohol use: Yes     Comment: a couple times a month     Family History   Problem Relation Age of Onset    C.A.D. Paternal Grandfather     Hypertension Father     Lipids Father     Gastrointestinal Disease Father         Gall bladder removed (not due to stones)/polyps removed from colon age 60    Hyperlipidemia Father     Cerebrovascular Disease Father     Diabetes Paternal Grandmother     Arthritis Maternal Grandmother     Osteoporosis Maternal Grandmother     Genetic Disorder Mother         autoimmune    Hypertension Brother     Lipids Brother     Coronary Artery Disease No family hx of     Hyperlipidemia No family hx of     Cerebrovascular Disease No family hx of     Breast Cancer No family hx of     Colon Cancer No family hx of     Prostate Cancer No family hx of     Other Cancer No family hx of     Depression No family hx of     Anxiety Disorder No family hx of     Mental Illness No family hx of     Substance Abuse No family hx of     Anesthesia Reaction No family hx of     Asthma No family hx of     Genetic Disorder No family hx of     Thyroid Disease No family hx of     Obesity No family hx of     Unknown/Adopted No family hx of          Current Outpatient Medications   Medication Sig Dispense Refill    escitalopram (LEXAPRO) 20 MG tablet Take 1 tablet (20 mg) by mouth daily 90 tablet 1     Allergies   Allergen Reactions    Cats        Breast Cancer  Screenin/8/2021     6:54 PM   Breast CA Risk Assessment (FHS-7)   Do you have a family history of breast, colon, or ovarian cancer? No / Unknown         Mammogram Screening: Recommended annual mammography  Pertinent mammograms are reviewed under the imaging tab.    History of abnormal Pap smear: NO - age 30- 65 PAP every 3 years recommended      Latest Ref Rng & Units 2022     7:48 AM 2021     1:54 PM 2021     1:30 PM   PAP / HPV   PAP  Negative for Intraepithelial Lesion or Malignancy (NILM)      PAP (Historical)   ASC-US     HPV 16 DNA Negative Negative   Negative    HPV 18 DNA Negative Negative   Negative    Other HR HPV Negative Negative   Negative      Reviewed and updated as needed this visit by clinical staff   Tobacco  Allergies  Meds              Reviewed and updated as needed this visit by Provider                 Past Medical History:   Diagnosis Date    Abnormal Pap smear of cervix 2021    Arthritis 1988    ASCUS favor benign 05, 07, 08    all neg HPV      Past Surgical History:   Procedure Laterality Date    GENITOURINARY SURGERY      GYN SURGERY      NO HISTORY OF SURGERY         Review of Systems   Constitutional:  Negative for chills and fever.   HENT:  Positive for congestion and ear pain. Negative for hearing loss and sore throat.    Eyes:  Negative for pain and visual disturbance.   Respiratory:  Negative for cough and shortness of breath.    Cardiovascular:  Negative for chest pain, palpitations and peripheral edema.   Gastrointestinal:  Negative for abdominal pain, constipation, diarrhea, heartburn, hematochezia and nausea.   Breasts:  Negative for tenderness, breast mass and discharge.   Genitourinary:  Positive for pelvic pain. Negative for dysuria, frequency, genital sores, hematuria, urgency, vaginal bleeding and vaginal discharge.   Musculoskeletal:  Positive for arthralgias, joint swelling and myalgias.   Skin:  Negative for rash.  "  Neurological:  Positive for dizziness and paresthesias. Negative for weakness and headaches.   Psychiatric/Behavioral:  Negative for mood changes. The patient is not nervous/anxious.      CONSTITUTIONAL: NEGATIVE for fever, chills, change in weight  INTEGUMENTARY/SKIN: NEGATIVE for worrisome rashes, moles or lesions  EYES: NEGATIVE for vision changes or irritation  ENT: NEGATIVE for ear, mouth and throat problems  RESP: NEGATIVE for significant cough or SOB  BREAST: NEGATIVE for masses, tenderness or discharge  CV: NEGATIVE for chest pain, palpitations or peripheral edema  GI: NEGATIVE for nausea, abdominal pain, heartburn, or change in bowel habits  : NEGATIVE for unusual urinary or vaginal symptoms. No vaginal bleeding.  MUSCULOSKELETAL: NEGATIVE for significant arthralgias or myalgia  NEURO: NEGATIVE for weakness, dizziness or paresthesias  PSYCHIATRIC: NEGATIVE for changes in mood or affect      OBJECTIVE:   /80   Pulse 63   Temp 97.3  F (36.3  C) (Temporal)   Resp 18   Ht 1.67 m (5' 5.75\")   Wt 59.2 kg (130 lb 9.6 oz)   LMP  (LMP Unknown)   SpO2 98%   Breastfeeding No   BMI 21.24 kg/m    Physical Exam  GENERAL APPEARANCE: healthy, alert and no distress  EYES: Eyes grossly normal to inspection, PERRL and conjunctivae and sclerae normal  HENT: ear canals and TM's normal, nose and mouth without ulcers or lesions, oropharynx clear and oral mucous membranes moist  NECK: no adenopathy, no asymmetry, masses, or scars and thyroid normal to palpation  RESP: lungs clear to auscultation - no rales, rhonchi or wheezes  BREAST: normal without masses, tenderness or nipple discharge and no palpable axillary masses or adenopathy  CV: regular rate and rhythm, normal S1 S2, no S3 or S4, no murmur, click or rub, no peripheral edema and peripheral pulses strong  ABDOMEN: soft, nontender, no hepatosplenomegaly, no masses and bowel sounds normal  MS: no musculoskeletal defects are noted and gait is age appropriate " without ataxia  SKIN: no suspicious lesions or rashes  NEURO: Normal strength and tone, sensory exam grossly normal, mentation intact and speech normal  PSYCH: mentation appears normal and affect normal/bright    Diagnostic Test Results:  Labs reviewed in Epic  Results for orders placed or performed in visit on 10/11/23 (from the past 24 hour(s))   Hemoglobin A1c   Result Value Ref Range    Hemoglobin A1C 5.6 0.0 - 5.6 %       ASSESSMENT/PLAN:   (Z00.00) Routine general medical examination at a health care facility  (primary encounter diagnosis)  Comment: Discussed diet,calcium,exercise.Went over self breast exam.Thin prep was NOT done.Eyes and teeth UTD.No immunizations needed today.See orders below for tests ordered and screening needed.    Plan: Lipid panel reflex to direct LDL Fasting,         Vitamin D Deficiency            (F41.9) Anxiety  Comment: she has been doing well on the lexapro and no side effects  is controlling her anxiety well   Plan: escitalopram (LEXAPRO) 20 MG tablet        Refilled and will follow up in 6 months sooner if any concerns     (Z83.3) Family history of diabetes mellitus  Comment: will screen   Plan: Hemoglobin A1c        As above     Patient has been advised of split billing requirements and indicates understanding: Yes      COUNSELING:  Reviewed preventive health counseling, as reflected in patient instructions       Regular exercise       Healthy diet/nutrition       Vision screening       Hearing screening        She reports that she has never smoked. She has never used smokeless tobacco.        Brenda Duckworth MD  [facility]  Answers submitted by the patient for this visit:  Patient Health Questionnaire (Submitted on 10/11/2023)  If you checked off any problems, how difficult have these problems made it for you to do your work, take care of things at home, or get along with other people?: Not difficult at all  PHQ9 TOTAL SCORE: 0  AMEENA-7 (Submitted on  10/11/2023)  AMEENA 7 TOTAL SCORE: 0

## 2024-05-01 ENCOUNTER — MYC REFILL (OUTPATIENT)
Dept: FAMILY MEDICINE | Facility: CLINIC | Age: 54
End: 2024-05-01
Payer: COMMERCIAL

## 2024-05-01 DIAGNOSIS — F41.9 ANXIETY: ICD-10-CM

## 2024-05-01 RX ORDER — ESCITALOPRAM OXALATE 20 MG/1
20 TABLET ORAL DAILY
Qty: 90 TABLET | Refills: 0 | Status: SHIPPED | OUTPATIENT
Start: 2024-05-01 | End: 2024-07-31

## 2024-07-31 DIAGNOSIS — F41.9 ANXIETY: ICD-10-CM

## 2024-08-01 RX ORDER — ESCITALOPRAM OXALATE 20 MG/1
20 TABLET ORAL DAILY
Qty: 90 TABLET | Refills: 0 | Status: SHIPPED | OUTPATIENT
Start: 2024-08-01

## 2024-12-29 ENCOUNTER — HEALTH MAINTENANCE LETTER (OUTPATIENT)
Age: 54
End: 2024-12-29

## 2025-07-21 ENCOUNTER — PATIENT OUTREACH (OUTPATIENT)
Dept: CARE COORDINATION | Facility: CLINIC | Age: 55
End: 2025-07-21
Payer: COMMERCIAL